# Patient Record
Sex: FEMALE | Race: WHITE
[De-identification: names, ages, dates, MRNs, and addresses within clinical notes are randomized per-mention and may not be internally consistent; named-entity substitution may affect disease eponyms.]

---

## 2023-04-05 ENCOUNTER — HOSPITAL ENCOUNTER (INPATIENT)
Dept: HOSPITAL 54 - ER | Age: 83
LOS: 4 days | Discharge: HOME | DRG: 871 | End: 2023-04-09
Attending: INTERNAL MEDICINE | Admitting: NURSE PRACTITIONER
Payer: MEDICARE

## 2023-04-05 VITALS — BODY MASS INDEX: 26.29 KG/M2 | HEIGHT: 64 IN | WEIGHT: 154 LBS

## 2023-04-05 VITALS — SYSTOLIC BLOOD PRESSURE: 157 MMHG | DIASTOLIC BLOOD PRESSURE: 66 MMHG

## 2023-04-05 DIAGNOSIS — J15.6: ICD-10-CM

## 2023-04-05 DIAGNOSIS — I50.30: ICD-10-CM

## 2023-04-05 DIAGNOSIS — M19.90: ICD-10-CM

## 2023-04-05 DIAGNOSIS — J45.901: ICD-10-CM

## 2023-04-05 DIAGNOSIS — I70.0: ICD-10-CM

## 2023-04-05 DIAGNOSIS — J45.909: ICD-10-CM

## 2023-04-05 DIAGNOSIS — J96.01: ICD-10-CM

## 2023-04-05 DIAGNOSIS — N17.0: ICD-10-CM

## 2023-04-05 DIAGNOSIS — A41.9: Primary | ICD-10-CM

## 2023-04-05 DIAGNOSIS — E88.09: ICD-10-CM

## 2023-04-05 DIAGNOSIS — E78.5: ICD-10-CM

## 2023-04-05 DIAGNOSIS — E44.1: ICD-10-CM

## 2023-04-05 DIAGNOSIS — I21.A1: ICD-10-CM

## 2023-04-05 DIAGNOSIS — I11.0: ICD-10-CM

## 2023-04-05 DIAGNOSIS — Z20.822: ICD-10-CM

## 2023-04-05 DIAGNOSIS — K74.60: ICD-10-CM

## 2023-04-05 DIAGNOSIS — E11.649: ICD-10-CM

## 2023-04-05 DIAGNOSIS — E87.5: ICD-10-CM

## 2023-04-05 LAB
ALBUMIN SERPL BCP-MCNC: 3.3 G/DL (ref 3.4–5)
ALP SERPL-CCNC: 141 U/L (ref 46–116)
ALT SERPL W P-5'-P-CCNC: 19 U/L (ref 12–78)
AST SERPL W P-5'-P-CCNC: 37 U/L (ref 15–37)
BASOPHILS # BLD AUTO: 0 K/UL (ref 0–0.2)
BASOPHILS NFR BLD AUTO: 0.2 % (ref 0–2)
BILIRUB DIRECT SERPL-MCNC: 0.3 MG/DL (ref 0–0.2)
BILIRUB SERPL-MCNC: 0.5 MG/DL (ref 0.2–1)
BILIRUB UR QL STRIP: NEGATIVE
BUN SERPL-MCNC: 47 MG/DL (ref 7–18)
CALCIUM SERPL-MCNC: 8.6 MG/DL (ref 8.5–10.1)
CHLORIDE SERPL-SCNC: 103 MMOL/L (ref 98–107)
CO2 SERPL-SCNC: 23 MMOL/L (ref 21–32)
COLOR UR: YELLOW
CREAT SERPL-MCNC: 2.5 MG/DL (ref 0.6–1.3)
DEPRECATED SQUAMOUS URNS QL MICRO: (no result) /HPF
EOSINOPHIL NFR BLD AUTO: 0.3 % (ref 0–6)
GLUCOSE SERPL-MCNC: 53 MG/DL (ref 74–106)
GLUCOSE UR STRIP-MCNC: NEGATIVE MG/DL
HCT VFR BLD AUTO: 29 % (ref 33–45)
HGB BLD-MCNC: 9.3 G/DL (ref 11.5–14.8)
LEUKOCYTE ESTERASE UR QL STRIP: NEGATIVE
LYMPHOCYTES NFR BLD AUTO: 0.7 K/UL (ref 0.8–4.8)
LYMPHOCYTES NFR BLD AUTO: 9.9 % (ref 20–44)
MCHC RBC AUTO-ENTMCNC: 33 G/DL (ref 31–36)
MCV RBC AUTO: 84 FL (ref 82–100)
MONOCYTES NFR BLD AUTO: 0.4 K/UL (ref 0.1–1.3)
MONOCYTES NFR BLD AUTO: 6.2 % (ref 2–12)
NEUTROPHILS # BLD AUTO: 5.5 K/UL (ref 1.8–8.9)
NEUTROPHILS NFR BLD AUTO: 83.4 % (ref 43–81)
NITRITE UR QL STRIP: NEGATIVE
PH UR STRIP: 5.5 [PH] (ref 5–8)
PLATELET # BLD AUTO: 137 K/UL (ref 150–450)
POTASSIUM SERPL-SCNC: 4.4 MMOL/L (ref 3.5–5.1)
PROT SERPL-MCNC: 8.2 G/DL (ref 6.4–8.2)
PROT UR QL STRIP: (no result) MG/DL
RBC # BLD AUTO: 3.42 MIL/UL (ref 4–5.2)
RBC #/AREA URNS HPF: (no result) /HPF (ref 0–2)
SODIUM SERPL-SCNC: 138 MMOL/L (ref 136–145)
UROBILINOGEN UR STRIP-MCNC: 0.2 EU/DL
WBC #/AREA URNS HPF: (no result) /HPF (ref 0–3)
WBC NRBC COR # BLD AUTO: 6.6 K/UL (ref 4.3–11)

## 2023-04-05 PROCEDURE — A4223 INFUSION SUPPLIES W/O PUMP: HCPCS

## 2023-04-05 PROCEDURE — C9803 HOPD COVID-19 SPEC COLLECT: HCPCS

## 2023-04-05 PROCEDURE — G0378 HOSPITAL OBSERVATION PER HR: HCPCS

## 2023-04-05 RX ADMIN — ATORVASTATIN CALCIUM SCH MG: 10 TABLET, FILM COATED ORAL at 22:54

## 2023-04-05 RX ADMIN — Medication SCH EACH: at 23:18

## 2023-04-05 NOTE — NUR
TELE RN ADMITTING NOTES



RECEIVED PATIENT AWAKE A/OX3. Hebrew SPEAKING BUT UNDERSTAND WORDS IN ENGLISH. PATIENT ON 
NASAL CANULA 2LPM. NO SOB, BREATHING EVENLY AND NON LABORED. NO IN ANY DISTRESS NOTED. IV 
ACCESS ON LEFT ARM #20 SALINE LOCK NOTED TO BE FLUSHING WELL AND INTACT. PATIENT DENIES ANY 
PAIN AT THIS MOMENT. PATIENT IS FEBRILE WITH TEMP .2. COOLING MEASURE IS DONE. 
MEDICATION IS GIVEN AS ORDERED. PATIENT ON TELE MONITOR READING OF SINUS RHYTHM 93BPM. 
PERFORMED SKIN ASSESSMENT AND BODY CHECK; SKIN NOTED TO BE INTACT NO REDNESS OR SKIN LESIONS 
NOTED. NO PRESENCE OF EDEMA. PATIENT IS ORIENTED TO THE ROOM AND HOW TO USE THE CALL LIGHT 
WHEN HELP IS NEEDED. ALL BELONGINGS ARE ACCOUNTED FOR. SAFETY MEASURE IN PLACED; BED LOCKED 
AND IN LOWEST POSITION, SIDE RAILS UP X2, HOB ELEVATED, CALL LIGHT AND BEDSIDE TABLE WITHIN 
PATIENT REACH.

## 2023-04-05 NOTE — NUR
RN NOTES



CHECKED PATIENT BLOOD SUGAR RESULT IS 34. CHARGE NURSE IS INFORMED. D5 DRIP IS GIVEN. WILL 
CONTINUE TO MONITOR.

## 2023-04-05 NOTE — NUR
BIB PARAMEDIC FROM HOME. LOW BLD SUGAR AND FEVER. INITIAL BLD SUGAR BY 
PARAMEDICS 40. GAVE IM GLUCAGON. REPEAT ACCUCHECK AT ADMISSION IS 48. PT PUT ON 
MONITOR AND PULSE, DID RECTAL TEMP 100.1F,   PUT ON GOWN. NURSING CARE DONE 
MADE MD AWAR OF THE BLD SUGAR. PT AWAKE ORIENTED WITH  ON O2 AT 4L. 
STRETCHER BORNE AWAITING FOR MD ORDERS

## 2023-04-06 VITALS — DIASTOLIC BLOOD PRESSURE: 63 MMHG | SYSTOLIC BLOOD PRESSURE: 161 MMHG

## 2023-04-06 VITALS — DIASTOLIC BLOOD PRESSURE: 62 MMHG | SYSTOLIC BLOOD PRESSURE: 160 MMHG

## 2023-04-06 VITALS — DIASTOLIC BLOOD PRESSURE: 49 MMHG | SYSTOLIC BLOOD PRESSURE: 136 MMHG

## 2023-04-06 VITALS — SYSTOLIC BLOOD PRESSURE: 132 MMHG | DIASTOLIC BLOOD PRESSURE: 59 MMHG

## 2023-04-06 VITALS — SYSTOLIC BLOOD PRESSURE: 171 MMHG | DIASTOLIC BLOOD PRESSURE: 67 MMHG

## 2023-04-06 VITALS — DIASTOLIC BLOOD PRESSURE: 52 MMHG | SYSTOLIC BLOOD PRESSURE: 121 MMHG

## 2023-04-06 VITALS — SYSTOLIC BLOOD PRESSURE: 161 MMHG | DIASTOLIC BLOOD PRESSURE: 63 MMHG

## 2023-04-06 LAB
ALBUMIN SERPL BCP-MCNC: 2.6 G/DL (ref 3.4–5)
ALP SERPL-CCNC: 115 U/L (ref 46–116)
ALT SERPL W P-5'-P-CCNC: 18 U/L (ref 12–78)
AST SERPL W P-5'-P-CCNC: 34 U/L (ref 15–37)
BASOPHILS # BLD AUTO: 0 K/UL (ref 0–0.2)
BASOPHILS NFR BLD AUTO: 0 % (ref 0–2)
BILIRUB SERPL-MCNC: 0.5 MG/DL (ref 0.2–1)
BUN SERPL-MCNC: 41 MG/DL (ref 7–18)
CALCIUM SERPL-MCNC: 8 MG/DL (ref 8.5–10.1)
CHLORIDE SERPL-SCNC: 106 MMOL/L (ref 98–107)
CO2 SERPL-SCNC: 19 MMOL/L (ref 21–32)
CREAT SERPL-MCNC: 1.9 MG/DL (ref 0.6–1.3)
EOSINOPHIL NFR BLD AUTO: 0 % (ref 0–6)
GLUCOSE SERPL-MCNC: 234 MG/DL (ref 74–106)
HCT VFR BLD AUTO: 25 % (ref 33–45)
HGB BLD-MCNC: 7.9 G/DL (ref 11.5–14.8)
LYMPHOCYTES NFR BLD AUTO: 0.2 K/UL (ref 0.8–4.8)
LYMPHOCYTES NFR BLD AUTO: 4.1 % (ref 20–44)
MAGNESIUM SERPL-MCNC: 2.5 MG/DL (ref 1.8–2.4)
MCHC RBC AUTO-ENTMCNC: 32 G/DL (ref 31–36)
MCV RBC AUTO: 84 FL (ref 82–100)
MONOCYTES NFR BLD AUTO: 0.1 K/UL (ref 0.1–1.3)
MONOCYTES NFR BLD AUTO: 2.3 % (ref 2–12)
NEUTROPHILS # BLD AUTO: 4.7 K/UL (ref 1.8–8.9)
NEUTROPHILS NFR BLD AUTO: 93.6 % (ref 43–81)
PHOSPHATE SERPL-MCNC: 4.8 MG/DL (ref 2.5–4.9)
PLATELET # BLD AUTO: 121 K/UL (ref 150–450)
POTASSIUM SERPL-SCNC: 5.2 MMOL/L (ref 3.5–5.1)
PROT SERPL-MCNC: 6.6 G/DL (ref 6.4–8.2)
RBC # BLD AUTO: 2.94 MIL/UL (ref 4–5.2)
SODIUM SERPL-SCNC: 139 MMOL/L (ref 136–145)
WBC NRBC COR # BLD AUTO: 5 K/UL (ref 4.3–11)

## 2023-04-06 RX ADMIN — Medication SCH EACH: at 07:28

## 2023-04-06 RX ADMIN — INSULIN GLARGINE SCH UNIT: 100 INJECTION, SOLUTION SUBCUTANEOUS at 18:06

## 2023-04-06 RX ADMIN — CEFEPIME HYDROCHLORIDE SCH MLS/HR: 1 INJECTION, POWDER, FOR SOLUTION INTRAMUSCULAR; INTRAVENOUS at 17:13

## 2023-04-06 RX ADMIN — FERROUS SULFATE TAB 325 MG (65 MG ELEMENTAL FE) SCH MG: 325 (65 FE) TAB at 08:48

## 2023-04-06 RX ADMIN — Medication SCH TAB: at 08:48

## 2023-04-06 RX ADMIN — ALBUTEROL SULFATE SCH MG: 2.5 SOLUTION RESPIRATORY (INHALATION) at 14:00

## 2023-04-06 RX ADMIN — SACUBITRIL AND VALSARTAN SCH EACH: 24; 26 TABLET, FILM COATED ORAL at 08:54

## 2023-04-06 RX ADMIN — LEVOTHYROXINE SODIUM SCH MCG: 75 TABLET ORAL at 07:43

## 2023-04-06 RX ADMIN — ASPIRIN 81 MG SCH MG: 81 TABLET ORAL at 08:48

## 2023-04-06 RX ADMIN — POLYETHYLENE GLYCOL 3350 SCH GM: 17 POWDER, FOR SOLUTION ORAL at 08:48

## 2023-04-06 RX ADMIN — ALBUTEROL SULFATE SCH MG: 2.5 SOLUTION RESPIRATORY (INHALATION) at 01:25

## 2023-04-06 RX ADMIN — INSULIN HUMAN PRN UNITS: 100 INJECTION, SOLUTION PARENTERAL at 07:31

## 2023-04-06 RX ADMIN — Medication SCH MG: at 01:25

## 2023-04-06 RX ADMIN — INSULIN GLARGINE SCH UNIT: 100 INJECTION, SOLUTION SUBCUTANEOUS at 21:56

## 2023-04-06 RX ADMIN — Medication SCH EACH: at 17:10

## 2023-04-06 RX ADMIN — ALBUTEROL SULFATE SCH MG: 2.5 SOLUTION RESPIRATORY (INHALATION) at 07:35

## 2023-04-06 RX ADMIN — ALBUTEROL SULFATE SCH MG: 2.5 SOLUTION RESPIRATORY (INHALATION) at 19:54

## 2023-04-06 RX ADMIN — INSULIN HUMAN PRN UNITS: 100 INJECTION, SOLUTION PARENTERAL at 11:27

## 2023-04-06 RX ADMIN — INSULIN HUMAN PRN UNITS: 100 INJECTION, SOLUTION PARENTERAL at 17:11

## 2023-04-06 RX ADMIN — Medication SCH EACH: at 11:27

## 2023-04-06 RX ADMIN — FLUTICASONE PROPIONATE SCH GM: 50 SPRAY, METERED NASAL at 08:53

## 2023-04-06 RX ADMIN — Medication SCH EACH: at 21:49

## 2023-04-06 RX ADMIN — Medication SCH MG: at 09:00

## 2023-04-06 RX ADMIN — Medication SCH MG: at 07:35

## 2023-04-06 RX ADMIN — ATORVASTATIN CALCIUM SCH MG: 10 TABLET, FILM COATED ORAL at 21:38

## 2023-04-06 RX ADMIN — NIFEDIPINE SCH MG: 60 TABLET, EXTENDED RELEASE ORAL at 08:55

## 2023-04-06 RX ADMIN — Medication SCH MG: at 14:00

## 2023-04-06 RX ADMIN — INSULIN HUMAN PRN UNITS: 100 INJECTION, SOLUTION PARENTERAL at 22:02

## 2023-04-06 RX ADMIN — SACUBITRIL AND VALSARTAN SCH EACH: 24; 26 TABLET, FILM COATED ORAL at 17:00

## 2023-04-06 RX ADMIN — ALBUTEROL SULFATE SCH MG: 2.5 SOLUTION RESPIRATORY (INHALATION) at 09:00

## 2023-04-06 RX ADMIN — Medication SCH MG: at 19:54

## 2023-04-06 RX ADMIN — PANTOPRAZOLE SODIUM SCH MG: 40 TABLET, DELAYED RELEASE ORAL at 07:43

## 2023-04-06 NOTE — NUR
RN NOTES



PT WITH HIGH LEVEL POTASSIUM 5.2 THIS MORNING, ADMINISTERED KAYEXALATE 15GMS PO AS ORDERED.

## 2023-04-06 NOTE — NUR
TELE RN OPENING NOTES



RECEIVED PATIENT AWAKE IN BED A/OX4. Slovenian SPEAKING BUT UNDERSTAND WORDS IN ENGLISH. ABLE 
TO MAKE NEEDS KNOWN. PATIENT ON NASAL CANULA 2LPM. NO SOB, BREATHING EVENLY AND NON LABORED. 
NO SIGNS OF ANY DISTRESS NOTED. IV ACCESS ON LEFT AC #20 SALINE LOCK NOTED, FLUSHING WELL 
AND INTACT. PATIENT DENIES ANY PAIN AT THIS MOMENT. PATIENT ON TELE MONITOR READING OF SINUS 
RHYTHM WITH FIRST DEGREE AVB. SAFETY MEASURE IN PLACED; BED LOCKED AND IN LOWEST POSITION, 
SIDE RAILS UP X2, HOB ELEVATED, CALL LIGHT AND BEDSIDE TABLE WITHIN PATIENT REACH. WILL 
CONTINUE TO MONITOR PATIENT.

## 2023-04-06 NOTE — NUR
RN NOTES 



PATIENT IS FEBRILE 101.1 COOLING MEASURE IS DONE. TYLENOL IS GIVEN AS ORDERED. WILL CONTINUE 
TO MONITOR.

## 2023-04-06 NOTE — NUR
TELE RN CLOSING





PATIENT IN BED AWAKE AND WATCHING TV, A/OX4. Zimbabwean SPEAKING BUT UNDERSTAND WORDS IN 
ENGLISH. ABLE TO MAKE NEEDS KNOWN. PATIENT ON NASAL CANULA 2LPM. NO SOB, BREATHING EVENLY 
AND NON LABORED. NO ANY SIGNS OF DISTRESS. IV ACCESS ON LEFT AC #20 SALINE LOCK, FLUSHING 
WELL, PATENT AND INTACT. PATIENT DENIES ANY PAIN AT THIS MOMENT. PATIENT ON TELE MONITOR 
READING OF SINUS RHYTHM WITH FIRST DEGREE AVB, HR 88. SAFETY MEASURE IN PLACED; BED LOCKED 
AND IN LOWEST POSITION, SIDE RAILS UP X2, HOB ELEVATED, CALL LIGHT AND BEDSIDE TABLE WITHIN 
PATIENT REACH. WILL ENDORSE TO THE NIGHT SHIFT NURSE FOR CONTINUITY OF CARE.

## 2023-04-06 NOTE — NUR
RN NOTES



REASSESSED PATIENT. BLOOD SUGAR IS 93. GIVEN ONE ORANGE JUICE TO HELP ELEVATE BLOOD SUGAR.

## 2023-04-06 NOTE — NUR
RN NOTES



PATIENT WITH ACCU-CHECK  MG/DL, 10 UNITS OF REGULAR INSULIN GIVEN AS ORDERED. 
RECHECKED @ 5867 AND PATIENT HAS AN ACCU-CHECK  MG/DL. NOTIFIED DR. PARKER WITH 
ORDER TO ADMINISTER LANTUS 5 UNITS NOW AND DAILY AT . ORDER CARRIED OUT.

## 2023-04-06 NOTE — NUR
TELE RN CLOSING NOTES



PATIENT IN BED AWAKE A/OX4. Korean SPEAKING BUT UNDERSTAND WORDS IN ENGLISH. ABLE TO MAKE 
NEEDS KNOWN. PATIENT ON NASAL CANULA 2LPM. NO SOB, BREATHING EVENLY AND NON LABORED. NO IN 
ANY DISTRESS NOTED. IV ACCESS ON LEFT ARM #20 SALINE LOCK NOTED TO BE FLUSHING WELL AND 
INTACT. PATIENT DENIES ANY PAIN AT THIS MOMENT. PATIENT ON TELE MONITOR READING OF SINUS 
RHYTHM WITH FIRST DEGREE AVB. LAB CALLED FOR TROPONIN THREAD FROM .8 DR ON CALL MADE 
AWARE, NOT NEW ORDERS GIVEN. SAFETY MEASURE IN PLACED; BED LOCKED AND IN LOWEST POSITION, 
SIDE RAILS UP X2, HOB ELEVATED, CALL LIGHT AND BEDSIDE TABLE WITHIN PATIENT REACH. WILL 
ENDORSE TO NEXT SHIFT NURSE FOR CONTINUITY OF CARE.

## 2023-04-06 NOTE — NUR
RN OPENING NOTE



RECEIVED PT. ASLEEP IN BED. PT IS A/OX4, Dutch SPEAKING BUT CAN SPEAK AND UNDERSTAND 
SIMPLE ENGLISH. PT IS IN 2LPM O2 VIA NASAL CANNULA, TOLERATING WELL, BREATHING AND UNLABORED 
@ THIS TIME. PT IV PRESENT ON LEFT AC @20G RUNNING NS @ 5MLS/HR, PATENT, INTACT AND FLUSHES 
WELL W/ NO S&SX OF INFILTRATION @ SITE NOTED. PT CAN INDEPENDENTLY WALK TO THE BATHROOM WITH 
ASSISTANCE. SAFETY MEASURES IS IN PLACE. BED PLACED AT ITS LOWEST AND LOCKED POSITION. SIDE 
RAILS UP X 2. BEDSIDE TABLE AND CALL LIGHT IS EASY REACH. BED ALARM IS ON. WILL CONTINUE TO 
MONITOR PT ACCORDINGLY.

## 2023-04-06 NOTE — NUR
RN NOTES



PATIENT BLOOD SUGAR  AND GAVE 10 UNITS OF INSULIN. RECHECKED BLOOD SUGAR AT 1146 AND 
IT'S 436. INFORMED DR. PARKER AND HE SAID TO CONTINUE USE OF SLIDING SCALE.

## 2023-04-07 VITALS — DIASTOLIC BLOOD PRESSURE: 67 MMHG | SYSTOLIC BLOOD PRESSURE: 148 MMHG

## 2023-04-07 VITALS — SYSTOLIC BLOOD PRESSURE: 104 MMHG | DIASTOLIC BLOOD PRESSURE: 51 MMHG

## 2023-04-07 VITALS — DIASTOLIC BLOOD PRESSURE: 68 MMHG | SYSTOLIC BLOOD PRESSURE: 158 MMHG

## 2023-04-07 VITALS — DIASTOLIC BLOOD PRESSURE: 72 MMHG | SYSTOLIC BLOOD PRESSURE: 147 MMHG

## 2023-04-07 LAB
BASOPHILS # BLD AUTO: 0 K/UL (ref 0–0.2)
BASOPHILS NFR BLD AUTO: 0 % (ref 0–2)
BILIRUB UR QL STRIP: NEGATIVE
BUN SERPL-MCNC: 60 MG/DL (ref 7–18)
CALCIUM SERPL-MCNC: 8.4 MG/DL (ref 8.5–10.1)
CHLORIDE SERPL-SCNC: 105 MMOL/L (ref 98–107)
CO2 SERPL-SCNC: 22 MMOL/L (ref 21–32)
COLOR UR: YELLOW
CREAT SERPL-MCNC: 2.1 MG/DL (ref 0.6–1.3)
CREAT UR-MCNC: 22 MG/DL (ref 30–125)
EOSINOPHIL NFR BLD AUTO: 0 % (ref 0–6)
GLUCOSE SERPL-MCNC: 324 MG/DL (ref 74–106)
GLUCOSE UR STRIP-MCNC: (no result) MG/DL
HCT VFR BLD AUTO: 26 % (ref 33–45)
HGB BLD-MCNC: 8.5 G/DL (ref 11.5–14.8)
LEUKOCYTE ESTERASE UR QL STRIP: NEGATIVE
LYMPHOCYTES NFR BLD AUTO: 0.1 K/UL (ref 0.8–4.8)
LYMPHOCYTES NFR BLD AUTO: 1.3 % (ref 20–44)
MAGNESIUM SERPL-MCNC: 2.6 MG/DL (ref 1.8–2.4)
MCHC RBC AUTO-ENTMCNC: 33 G/DL (ref 31–36)
MCV RBC AUTO: 83 FL (ref 82–100)
MONOCYTES NFR BLD AUTO: 0.3 K/UL (ref 0.1–1.3)
MONOCYTES NFR BLD AUTO: 4 % (ref 2–12)
NEUTROPHILS # BLD AUTO: 7.2 K/UL (ref 1.8–8.9)
NEUTROPHILS NFR BLD AUTO: 94.7 % (ref 43–81)
NITRITE UR QL STRIP: NEGATIVE
PH UR STRIP: 6 [PH] (ref 5–8)
PHOSPHATE SERPL-MCNC: 4.1 MG/DL (ref 2.5–4.9)
PLATELET # BLD AUTO: 149 K/UL (ref 150–450)
POTASSIUM SERPL-SCNC: 4 MMOL/L (ref 3.5–5.1)
PROT UR QL STRIP: (no result) MG/DL
RBC # BLD AUTO: 3.14 MIL/UL (ref 4–5.2)
RBC #/AREA URNS HPF: (no result) /HPF (ref 0–2)
SODIUM SERPL-SCNC: 141 MMOL/L (ref 136–145)
SODIUM UR-SCNC: 78 MMOL/L (ref 40–220)
UROBILINOGEN UR STRIP-MCNC: 0.2 EU/DL
WBC #/AREA URNS HPF: (no result) /HPF (ref 0–3)
WBC NRBC COR # BLD AUTO: 7.6 K/UL (ref 4.3–11)

## 2023-04-07 RX ADMIN — ACETAMINOPHEN PRN MG: 325 TABLET ORAL at 17:31

## 2023-04-07 RX ADMIN — PANTOPRAZOLE SODIUM SCH MG: 40 TABLET, DELAYED RELEASE ORAL at 07:46

## 2023-04-07 RX ADMIN — ALBUTEROL SULFATE SCH MG: 2.5 SOLUTION RESPIRATORY (INHALATION) at 01:47

## 2023-04-07 RX ADMIN — ALBUTEROL SULFATE SCH MG: 2.5 SOLUTION RESPIRATORY (INHALATION) at 20:22

## 2023-04-07 RX ADMIN — NIFEDIPINE SCH MG: 60 TABLET, EXTENDED RELEASE ORAL at 08:24

## 2023-04-07 RX ADMIN — FLUTICASONE PROPIONATE SCH GM: 50 SPRAY, METERED NASAL at 08:24

## 2023-04-07 RX ADMIN — SODIUM CHLORIDE SCH MLS/HR: 9 INJECTION, SOLUTION INTRAVENOUS at 21:39

## 2023-04-07 RX ADMIN — INSULIN HUMAN PRN UNITS: 100 INJECTION, SOLUTION PARENTERAL at 06:21

## 2023-04-07 RX ADMIN — FERROUS SULFATE TAB 325 MG (65 MG ELEMENTAL FE) SCH MG: 325 (65 FE) TAB at 08:18

## 2023-04-07 RX ADMIN — INSULIN HUMAN PRN UNITS: 100 INJECTION, SOLUTION PARENTERAL at 11:57

## 2023-04-07 RX ADMIN — Medication SCH TAB: at 08:18

## 2023-04-07 RX ADMIN — CEFEPIME HYDROCHLORIDE SCH MLS/HR: 1 INJECTION, POWDER, FOR SOLUTION INTRAMUSCULAR; INTRAVENOUS at 18:21

## 2023-04-07 RX ADMIN — ALBUTEROL SULFATE SCH MG: 2.5 SOLUTION RESPIRATORY (INHALATION) at 07:42

## 2023-04-07 RX ADMIN — SACUBITRIL AND VALSARTAN SCH EACH: 24; 26 TABLET, FILM COATED ORAL at 08:22

## 2023-04-07 RX ADMIN — Medication SCH MG: at 20:22

## 2023-04-07 RX ADMIN — POLYETHYLENE GLYCOL 3350 SCH GM: 17 POWDER, FOR SOLUTION ORAL at 08:18

## 2023-04-07 RX ADMIN — Medication SCH EACH: at 11:57

## 2023-04-07 RX ADMIN — ALBUTEROL SULFATE SCH MG: 2.5 SOLUTION RESPIRATORY (INHALATION) at 13:08

## 2023-04-07 RX ADMIN — LEVOTHYROXINE SODIUM SCH MCG: 75 TABLET ORAL at 07:46

## 2023-04-07 RX ADMIN — Medication SCH EACH: at 06:21

## 2023-04-07 RX ADMIN — SACUBITRIL AND VALSARTAN SCH EACH: 24; 26 TABLET, FILM COATED ORAL at 16:16

## 2023-04-07 RX ADMIN — Medication SCH MG: at 13:08

## 2023-04-07 RX ADMIN — INSULIN HUMAN PRN UNITS: 100 INJECTION, SOLUTION PARENTERAL at 17:19

## 2023-04-07 RX ADMIN — Medication SCH EACH: at 22:27

## 2023-04-07 RX ADMIN — INSULIN HUMAN PRN UNITS: 100 INJECTION, SOLUTION PARENTERAL at 22:24

## 2023-04-07 RX ADMIN — Medication SCH EACH: at 17:20

## 2023-04-07 RX ADMIN — ASPIRIN 81 MG SCH MG: 81 TABLET ORAL at 08:18

## 2023-04-07 RX ADMIN — Medication SCH MG: at 01:47

## 2023-04-07 RX ADMIN — ATORVASTATIN CALCIUM SCH MG: 10 TABLET, FILM COATED ORAL at 22:00

## 2023-04-07 RX ADMIN — Medication SCH MG: at 07:42

## 2023-04-07 NOTE — NUR
RN NOTES



PATIENT COMPLAINED OF LOWER BACK PAIN AND ASKED FOR SOME MEDICINE. SHE RATE THE PAIN 3 OUT 
OF 10. TYLENOL 650MG GIVEN.

## 2023-04-07 NOTE — NUR
RN CLOSING NOTE



PT IS AWAKE AND RESTING COMFORTABLY IN BED. PT IS A/O X 4, RESPONSIVE AND FOLLOWS VERBAL 
COMMAND. PT IS IN 2LPM O2 VIA NASALA CANNULA, W/ NO S&SX OF RESPIRATORY DISTRESS NOTED @ 
THIS TIME. PT IV PRESENT ON LEFT AC #20G SALINE LOCK,  INTACT AND FLUSHES WELL WITH NO S &SX 
OF INFILTRATION. PT IS ON TELE MONITOR W/ CURRENT READING OF SINUS TACHYCARDIA  BPM. 
PT INDEPENDENTLY WALKS IN THE BATHROOM W/ STANDBY ASSIST.  ADMINISTERED MEDICATION 
ACCORDINGLY AS PER MD'S ORDER. VITAL SIGNS TAKEN AND DOCUMENTED. ALL NEEDS ATTENDED. SAFETY 
MEASURES IS IN PLACE. BED AT ITS LOWEST AND LOCKED POSITION. SIDE RAILS UP X 2.  BEDSIDE 
TABLE AND CALL LIGHT IS EASY REACH. BED ALARM IS ON. WILL ENDORSE TO THE NEXT SHIFT FOR 
CONTINUITY OF CARE.

## 2023-04-07 NOTE — NUR
TELE RN OPENING NOTES:



RECEIVED PATIENT AWAEK IN BED, BED IN LOW POSITION CALL LIGHTS WITHIN REACH, NO COMPLAIN OF 
PAIN AND DISCOMFORT A THIS TIME, ON ROOM AIR SATURATING WELL, PATIENT IS A/O X4 [place] citizen 
SPEAKING ABLE TO MAKE NEEDS KNOWN, ON TELE MONITOR- SR-94 , PATIENT IS AMBULATORY WITH 
ASSIST,  IV LINE AT LAC#20 SL, PATIENT KEPT CLEAN AND DRY ALL NEEDS MET WILL CONTINUE TO 
MONITOR.

## 2023-04-07 NOTE — NUR
RECEIVED PATIENT ON 2LNC SATURATIONS AT 98%. MD ORDERED HHN TXS SHIVA WELL WITH NO ADVERSE 
REACTION NOTED. NO SOB NOTED.

## 2023-04-07 NOTE — NUR
TELE RN OPENING NOTES



RECEIVED PATIENT AWAKE IN BED, A/OX4. Kiswahili SPEAKING BUT UNDERSTAND WORDS IN ENGLISH. 
ABLE TO MAKE NEEDS KNOWN. PATIENT ON NASAL CANULA 2LPM. NO SOB, BREATHING EVENLY AND NON 
LABORED. NO SIGNS OF ANY DISTRESS NOTED. IV ACCESS ON LEFT AC #20 SALINE LOCK NOTED, 
FLUSHING WELL AND INTACT. NO S & SX OF INFILTRATION. PATIENT DENIES ANY PAIN AT THIS MOMENT. 
PATIENT ON TELE MONITOR READING OF SINUS RHYTHM HR:93. SAFETY MEASURE IN PLACED; BED LOCKED 
AND IN LOWEST POSITION, SIDE RAILS UP X2, HOB ELEVATED, CALL LIGHT AND BEDSIDE TABLE WITHIN 
PATIENT REACH. WILL CONTINUE TO MONITOR PATIENT.

## 2023-04-07 NOTE — NUR
TELE RN CLOSING NOTES



PATIENT IN BED AWAKE AND WATCHING TV, A/OX4. East Timorese SPEAKING BUT UNDERSTAND WORDS IN 
ENGLISH. ABLE TO MAKE NEEDS KNOWN. PATIENT ON NASAL CANULA 2LPM. BREATHING EVENLY AND NON 
LABORED. NO ANY SIGNS OF DISTRESS. IV ACCESS ON LEFT AC #20 SALINE LOCK, FLUSHING WELL, 
PATENT AND INTACT. PATIENT DENIES ANY PAIN AT THIS MOMENT. PATIENT ON TELE MONITOR READING 
OF SINUS RHYTHM, HR 88. SAFETY MEASURE IN PLACED; BED LOCKED AND IN LOWEST POSITION, SIDE 
RAILS UP X2, HOB ELEVATED, CALL LIGHT AND BEDSIDE TABLE WITHIN PATIENT REACH. WILL ENDORSE 
TO THE NIGHT SHIFT NURSE FOR CONTINUITY OF CARE.

## 2023-04-08 VITALS — DIASTOLIC BLOOD PRESSURE: 62 MMHG | SYSTOLIC BLOOD PRESSURE: 129 MMHG

## 2023-04-08 VITALS — SYSTOLIC BLOOD PRESSURE: 155 MMHG | DIASTOLIC BLOOD PRESSURE: 70 MMHG

## 2023-04-08 VITALS — DIASTOLIC BLOOD PRESSURE: 65 MMHG | SYSTOLIC BLOOD PRESSURE: 141 MMHG

## 2023-04-08 VITALS — SYSTOLIC BLOOD PRESSURE: 139 MMHG | DIASTOLIC BLOOD PRESSURE: 60 MMHG

## 2023-04-08 VITALS — DIASTOLIC BLOOD PRESSURE: 65 MMHG | SYSTOLIC BLOOD PRESSURE: 153 MMHG

## 2023-04-08 VITALS — SYSTOLIC BLOOD PRESSURE: 151 MMHG | DIASTOLIC BLOOD PRESSURE: 68 MMHG

## 2023-04-08 LAB
ALBUMIN SERPL BCP-MCNC: 2.5 G/DL (ref 3.4–5)
ALP SERPL-CCNC: 103 U/L (ref 46–116)
ALT SERPL W P-5'-P-CCNC: 22 U/L (ref 12–78)
AST SERPL W P-5'-P-CCNC: 33 U/L (ref 15–37)
BILIRUB SERPL-MCNC: 0.5 MG/DL (ref 0.2–1)
BUN SERPL-MCNC: 54 MG/DL (ref 7–18)
CALCIUM SERPL-MCNC: 8.4 MG/DL (ref 8.5–10.1)
CHLORIDE SERPL-SCNC: 109 MMOL/L (ref 98–107)
CO2 SERPL-SCNC: 24 MMOL/L (ref 21–32)
CREAT SERPL-MCNC: 2.1 MG/DL (ref 0.6–1.3)
GLUCOSE SERPL-MCNC: 225 MG/DL (ref 74–106)
POTASSIUM SERPL-SCNC: 4 MMOL/L (ref 3.5–5.1)
PROT SERPL-MCNC: 7.1 G/DL (ref 6.4–8.2)
SODIUM SERPL-SCNC: 142 MMOL/L (ref 136–145)

## 2023-04-08 RX ADMIN — INSULIN HUMAN PRN UNITS: 100 INJECTION, SOLUTION PARENTERAL at 12:02

## 2023-04-08 RX ADMIN — NIFEDIPINE SCH MG: 60 TABLET, EXTENDED RELEASE ORAL at 08:35

## 2023-04-08 RX ADMIN — Medication SCH MG: at 07:35

## 2023-04-08 RX ADMIN — INSULIN HUMAN PRN UNITS: 100 INJECTION, SOLUTION PARENTERAL at 17:14

## 2023-04-08 RX ADMIN — Medication SCH EACH: at 11:56

## 2023-04-08 RX ADMIN — FERROUS SULFATE TAB 325 MG (65 MG ELEMENTAL FE) SCH MG: 325 (65 FE) TAB at 08:35

## 2023-04-08 RX ADMIN — FLUTICASONE PROPIONATE SCH GM: 50 SPRAY, METERED NASAL at 08:35

## 2023-04-08 RX ADMIN — Medication SCH MG: at 13:14

## 2023-04-08 RX ADMIN — ACETAMINOPHEN PRN MG: 325 TABLET ORAL at 21:32

## 2023-04-08 RX ADMIN — ASPIRIN 81 MG SCH MG: 81 TABLET ORAL at 08:35

## 2023-04-08 RX ADMIN — PANTOPRAZOLE SODIUM SCH MG: 40 TABLET, DELAYED RELEASE ORAL at 07:32

## 2023-04-08 RX ADMIN — ALBUTEROL SULFATE SCH MG: 2.5 SOLUTION RESPIRATORY (INHALATION) at 20:05

## 2023-04-08 RX ADMIN — LEVOTHYROXINE SODIUM SCH MCG: 75 TABLET ORAL at 07:33

## 2023-04-08 RX ADMIN — Medication SCH EACH: at 21:32

## 2023-04-08 RX ADMIN — SACUBITRIL AND VALSARTAN SCH EACH: 24; 26 TABLET, FILM COATED ORAL at 16:38

## 2023-04-08 RX ADMIN — Medication SCH MG: at 20:06

## 2023-04-08 RX ADMIN — CEFEPIME HYDROCHLORIDE SCH MLS/HR: 1 INJECTION, POWDER, FOR SOLUTION INTRAMUSCULAR; INTRAVENOUS at 17:05

## 2023-04-08 RX ADMIN — Medication SCH MG: at 02:27

## 2023-04-08 RX ADMIN — SODIUM CHLORIDE SCH MLS/HR: 9 INJECTION, SOLUTION INTRAVENOUS at 17:05

## 2023-04-08 RX ADMIN — INSULIN HUMAN PRN UNITS: 100 INJECTION, SOLUTION PARENTERAL at 21:44

## 2023-04-08 RX ADMIN — SACUBITRIL AND VALSARTAN SCH EACH: 24; 26 TABLET, FILM COATED ORAL at 08:36

## 2023-04-08 RX ADMIN — INSULIN HUMAN PRN UNITS: 100 INJECTION, SOLUTION PARENTERAL at 06:56

## 2023-04-08 RX ADMIN — Medication SCH EACH: at 17:16

## 2023-04-08 RX ADMIN — Medication SCH TAB: at 08:35

## 2023-04-08 RX ADMIN — Medication SCH EACH: at 06:56

## 2023-04-08 RX ADMIN — ALBUTEROL SULFATE SCH MG: 2.5 SOLUTION RESPIRATORY (INHALATION) at 07:35

## 2023-04-08 RX ADMIN — ALBUTEROL SULFATE SCH MG: 2.5 SOLUTION RESPIRATORY (INHALATION) at 02:27

## 2023-04-08 RX ADMIN — ALBUTEROL SULFATE SCH MG: 2.5 SOLUTION RESPIRATORY (INHALATION) at 13:14

## 2023-04-08 RX ADMIN — POLYETHYLENE GLYCOL 3350 SCH GM: 17 POWDER, FOR SOLUTION ORAL at 08:35

## 2023-04-08 NOTE — NUR
TELE RN CLOSING NOTES: 



PATIENT SLEEP IN BED COMFORTABLY, BED IN LOW POSITION CALL LIGHTS WITHIN REACH, NO COMPLAIN 
OF PAIN AND DISCOMFORT AT THIS TIME, ON O2 INHALATION AT 2LPM SATURATING WELL, PATIENT IS 
A/O X4 ABLE TO MAKE NEEDS KNOWN, AMBULATORY WITH ASSISTANCE, ON TELE MONITOR-SR-100 I LINE 
AT LAC#20 WITH ONGOING 0.9NSS@50ML/HR INFUSING WELL, PATIENT KEPT CLEAN AND DRY ALL NEEDS 
MET ENDORSE TO INCOMING SHIFT

## 2023-04-08 NOTE — NUR
TELE RN OPENING NOTES:



RECEIVED PT IN BED AWAKE, A/OX4 Ukrainian SPEAKING AND ABLE TO MAKE NEEDS KNOWN, NO SOB OR 
CARDIAC DISTRESS NOTED. ON O2 INHALATION AT 2LPM VIA NC AND TOLERATING WELL,ON TELE MONITOR 
WITH CURRENT READING OF SINUS RHYTHM @ 94BPM.IV ACCESS ON LAC GAUGE 20 PATENT, INTACT AND 
INFUSING NS 1L @ 50ML/HR. SAFETY MEASURES MAINTAINED: BED LOCKED AND IN LOWEST POSITION. 
SIDE RAILS UP X 2. CALL LIGHT IN EASY REACH FOR HELP. WILL MONITOR PT ACCORDINGLY.

## 2023-04-08 NOTE — NUR
TELE RN OPENING NOTE 



PATIENT AWAKE IN BED, ALERT/ORIENTED X 4, PRIMARILY Sammarinese SPEAKING BUT ABLE TO MAKE BASIC 
NEEDS KNOWN. PATIENT ON 2 LPM OF O2 VIA NASAL CANNULA, NO S/S OF DISTRESS OR SOB NOTED, 
BREATHING EVEN AND UNLABORED. PATIENT ON EXTERNAL CARDIAC MONITOR READING SINUS RHYTHM, HR: 
92. IV ACCESS ON LAC #20G INTACT AND INFUSING NS @ 50 ML/HR. SAFETY MEASURES IN PLACE: CALL 
LIGHT WITHIN REACH, SIDE RAILS UP X 2, BED LOCKED IN LOWEST POSITION, HOB ELEVATED, BED 
ALARM ON. WILL CONTINUE TO MONITOR PATIENT

## 2023-04-08 NOTE — NUR
TELE RN CLOSING NOTES:



PT IN BED AWAKE, A/OX4 Occitan SPEAKING AND ABLE TO MAKE NEEDS KNOWN, NO SOB OR CARDIAC 
DISTRESS NOTED. ON O2 INHALATION AT 2LPM VIA NC AND TOLERATING WELL,ON TELE MONITOR WITH 
CURRENT READING OF SINUS RHYTHM @ 93BPM.IV ACCESS ON LAC GAUGE 20 PATENT, INTACT AND 
INFUSING NS 1L @ 50ML/HR. SAFETY MEASURES MAINTAINED: BED LOCKED AND IN LOWEST POSITION. 
SIDE RAILS UP X 2. CALL LIGHT IN EASY REACH FOR HELP. WILL ENDORSE TO NIGHT SHIFT RN FOR 
CONTINUITY OF CARE. normal (ped)... In no apparent distress.

## 2023-04-08 NOTE — NUR
TELE RN NOTE 



PATIENT C/O RIB PAIN FROM COUGHING, TYLENOL 650 MG PO GIVEN AS ORDERED, WILL CONTINUE TO 
MONITOR

## 2023-04-08 NOTE — NUR
TELE RN NOTE 



PATIENT COUGHING AND REQUESTING COUGH MEDICINE, TESSALON PERLE 100 MG PO GIVEN AS ORDERED. 
WILL CONTINUE TO MONITOR

## 2023-04-09 VITALS — DIASTOLIC BLOOD PRESSURE: 59 MMHG | SYSTOLIC BLOOD PRESSURE: 131 MMHG

## 2023-04-09 VITALS — DIASTOLIC BLOOD PRESSURE: 64 MMHG | SYSTOLIC BLOOD PRESSURE: 154 MMHG

## 2023-04-09 VITALS — SYSTOLIC BLOOD PRESSURE: 143 MMHG | DIASTOLIC BLOOD PRESSURE: 66 MMHG

## 2023-04-09 LAB
ALBUMIN SERPL BCP-MCNC: 2.3 G/DL (ref 3.4–5)
ALP SERPL-CCNC: 99 U/L (ref 46–116)
ALT SERPL W P-5'-P-CCNC: 35 U/L (ref 12–78)
AST SERPL W P-5'-P-CCNC: 38 U/L (ref 15–37)
BILIRUB SERPL-MCNC: 0.5 MG/DL (ref 0.2–1)
BUN SERPL-MCNC: 47 MG/DL (ref 7–18)
CALCIUM SERPL-MCNC: 8.1 MG/DL (ref 8.5–10.1)
CHLORIDE SERPL-SCNC: 110 MMOL/L (ref 98–107)
CO2 SERPL-SCNC: 23 MMOL/L (ref 21–32)
CREAT SERPL-MCNC: 2 MG/DL (ref 0.6–1.3)
GLUCOSE SERPL-MCNC: 173 MG/DL (ref 74–106)
POTASSIUM SERPL-SCNC: 3.7 MMOL/L (ref 3.5–5.1)
PROT SERPL-MCNC: 6.9 G/DL (ref 6.4–8.2)
SODIUM SERPL-SCNC: 143 MMOL/L (ref 136–145)

## 2023-04-09 RX ADMIN — Medication SCH TAB: at 08:24

## 2023-04-09 RX ADMIN — ALBUTEROL SULFATE SCH MG: 2.5 SOLUTION RESPIRATORY (INHALATION) at 07:30

## 2023-04-09 RX ADMIN — FLUTICASONE PROPIONATE SCH GM: 50 SPRAY, METERED NASAL at 08:24

## 2023-04-09 RX ADMIN — PANTOPRAZOLE SODIUM SCH MG: 40 TABLET, DELAYED RELEASE ORAL at 08:24

## 2023-04-09 RX ADMIN — SACUBITRIL AND VALSARTAN SCH EACH: 24; 26 TABLET, FILM COATED ORAL at 09:20

## 2023-04-09 RX ADMIN — Medication SCH MG: at 01:35

## 2023-04-09 RX ADMIN — POLYETHYLENE GLYCOL 3350 SCH GM: 17 POWDER, FOR SOLUTION ORAL at 08:24

## 2023-04-09 RX ADMIN — ALBUTEROL SULFATE SCH MG: 2.5 SOLUTION RESPIRATORY (INHALATION) at 01:35

## 2023-04-09 RX ADMIN — LEVOTHYROXINE SODIUM SCH MCG: 75 TABLET ORAL at 08:24

## 2023-04-09 RX ADMIN — NIFEDIPINE SCH MG: 60 TABLET, EXTENDED RELEASE ORAL at 09:20

## 2023-04-09 RX ADMIN — Medication SCH MG: at 07:30

## 2023-04-09 RX ADMIN — FERROUS SULFATE TAB 325 MG (65 MG ELEMENTAL FE) SCH MG: 325 (65 FE) TAB at 08:24

## 2023-04-09 RX ADMIN — INSULIN HUMAN PRN UNITS: 100 INJECTION, SOLUTION PARENTERAL at 11:30

## 2023-04-09 RX ADMIN — Medication SCH EACH: at 11:27

## 2023-04-09 RX ADMIN — INSULIN HUMAN PRN UNITS: 100 INJECTION, SOLUTION PARENTERAL at 06:51

## 2023-04-09 RX ADMIN — Medication SCH EACH: at 06:49

## 2023-04-09 RX ADMIN — ASPIRIN 81 MG SCH MG: 81 TABLET ORAL at 08:24

## 2023-04-09 NOTE — NUR
TELE RN OPENING NOTE 



RECEIVED PATIENT AWAKE IN BED, A/O X 4, Namibian SPEAKING WITH BASIC ENGLISH AND ABLE TO 
MAKE NEEDS KNOWN. PATIENT ON 1 LPM OF O2 VIA NASAL CANNULA, NO S/S OF DISTRESS OR SOB NOTED, 
BREATHING EVEN AND UNLABORED, SPO2 92-95%. PATIENT ON EXTERNAL CARDIAC MONITOR READING SINUS 
RHYTHM, HR: 98. IV ACCESS ON LAC #20G INTACT AND INFUSING NS @ 50 ML/HR.  SAFETY MEASURES IN 
PLACE: CALL LIGHT WITHIN REACH, SIDE RAILS UP X 2, BED LOCKED IN LOWEST POSITION, HOB 
ELEVATED, BED ALARM ON. WILL CONTINUE TO MONITOR THE PATIENT FOR ABILIO

## 2023-04-09 NOTE — NUR
TELE RN CLOSING NOTE 



PATIENT AWAKE IN BED, ALERT/ORIENTED X 4, PRIMARILY Greek SPEAKING BUT ABLE TO MAKE BASIC 
NEEDS KNOWN. PATIENT ON 1 LPM OF O2 VIA NASAL CANNULA, NO S/S OF DISTRESS OR SOB NOTED, 
BREATHING EVEN AND UNLABORED, SPO2 92-95%. PATIENT ON EXTERNAL CARDIAC MONITOR READING SINUS 
RHYTHM, HR: 97. IV ACCESS ON LAC #20G INTACT AND INFUSING NS @ 50 ML/HR. MEDICATIONS GIVEN 
AS ORDERED, PT NEEDS MET THROUGHOUT SHIFT, ASSISTED PATIENT TO BSC 4 TIMES THIS SHIFT, NO 
BM. SAFETY MEASURES IN PLACE: CALL LIGHT WITHIN REACH, SIDE RAILS UP X 2, BED LOCKED IN 
LOWEST POSITION, HOB ELEVATED, BED ALARM ON. ENDORSED TO DAYSHIFT RN FOR CONTINUITY OF CARE

## 2023-04-09 NOTE — NUR
DISCHARGED PATIENT TO HOME, PATIENT'S FAMILY ON BEDSIDE DURING THE DISCHARGE. PATIENT WAS  
A/O X 4, Lebanese SPEAKING WITH BASIC ENGLISH AND ABLE TO MAKE NEEDS KNOWN. PATIENT WAS ON 
RA WHEN DISCHARGED, NO S/S OF DISTRESS OR SOB NOTED, BREATHING EVEN AND UNLABORED, SPO2 94%. 
IV ACCESS REMOVED, TIP INTACT, NO SIGNS OF BLEEDING WAS NOTED. ALL BELONGINGS ACCOUNTED FOR, 
ALL FAMILY REQUESTS ADDRESSED. DISCHARGED INSTRUCTIONS DISCUSSED WITH THE PATIENT AND FAMILY 
BOTH VERBALLY AND IN WRITING. SAFETY PROTOCOL MAINTAINED. PATIENT LEFT THE UNIT AT 1230. 
CHARGE NURSE AWARE OF THE DISCHARGE.

## 2023-04-09 NOTE — NUR
Prior to discharge, Patient & family requested that two medications namely: Atorvastatin 
Calcium (Lipitor) 10 mg tab, 40 mg PO HS 30 days #30 tab & Doxycycline Hyclate 100 mg 
Capsule 1 cap PO BID #14 Cap, be routed and  at [facility] located at [address] [place] [address], tel# [phone]. The reason for the reroute is preferred pharmacy closed on 
Sundays. All remaining medications shall be picked up the next day. Request granted as 
approved by Charge nurse. MD informed.

## 2023-05-05 ENCOUNTER — HOSPITAL ENCOUNTER (INPATIENT)
Dept: HOSPITAL 54 - ER | Age: 83
LOS: 3 days | Discharge: NURSING, REHAB OR LONG-TERM CARE | DRG: 535 | End: 2023-05-08
Attending: INTERNAL MEDICINE | Admitting: INTERNAL MEDICINE
Payer: MEDICARE

## 2023-05-05 VITALS — HEIGHT: 64 IN | WEIGHT: 154 LBS | BODY MASS INDEX: 26.29 KG/M2

## 2023-05-05 VITALS — SYSTOLIC BLOOD PRESSURE: 145 MMHG | DIASTOLIC BLOOD PRESSURE: 64 MMHG

## 2023-05-05 DIAGNOSIS — E86.1: ICD-10-CM

## 2023-05-05 DIAGNOSIS — Z79.84: ICD-10-CM

## 2023-05-05 DIAGNOSIS — J45.909: ICD-10-CM

## 2023-05-05 DIAGNOSIS — E11.9: ICD-10-CM

## 2023-05-05 DIAGNOSIS — Z20.822: ICD-10-CM

## 2023-05-05 DIAGNOSIS — Z79.4: ICD-10-CM

## 2023-05-05 DIAGNOSIS — S32.511A: Primary | ICD-10-CM

## 2023-05-05 DIAGNOSIS — Z79.82: ICD-10-CM
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
## 2023-05-05 DIAGNOSIS — Z79.899: ICD-10-CM

## 2023-05-05 DIAGNOSIS — D69.6: ICD-10-CM

## 2023-05-05 DIAGNOSIS — I10: ICD-10-CM

## 2023-05-05 DIAGNOSIS — N17.0: ICD-10-CM

## 2023-05-05 DIAGNOSIS — M19.90: ICD-10-CM

## 2023-05-05 DIAGNOSIS — S42.214A: ICD-10-CM

## 2023-05-05 DIAGNOSIS — D63.8: ICD-10-CM

## 2023-05-05 DIAGNOSIS — D62: ICD-10-CM
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
## 2023-05-05 DIAGNOSIS — K21.9: ICD-10-CM

## 2023-05-05 DIAGNOSIS — E78.5: ICD-10-CM

## 2023-05-05 DIAGNOSIS — E03.9: ICD-10-CM

## 2023-05-05 DIAGNOSIS — Z79.51: ICD-10-CM

## 2023-05-05 DIAGNOSIS — D50.9: ICD-10-CM

## 2023-05-05 LAB
BASOPHILS # BLD AUTO: 0 K/UL (ref 0–0.2)
BASOPHILS NFR BLD AUTO: 0.7 % (ref 0–2)
BUN SERPL-MCNC: 38 MG/DL (ref 7–18)
CALCIUM SERPL-MCNC: 8.2 MG/DL (ref 8.5–10.1)
CHLORIDE SERPL-SCNC: 108 MMOL/L (ref 98–107)
CO2 SERPL-SCNC: 21 MMOL/L (ref 21–32)
CREAT SERPL-MCNC: 2.2 MG/DL (ref 0.6–1.3)
EOSINOPHIL NFR BLD AUTO: 2.2 % (ref 0–6)
EOSINOPHIL NFR BLD MANUAL: 2 % (ref 0–4)
GLUCOSE SERPL-MCNC: 122 MG/DL (ref 74–106)
HCT VFR BLD AUTO: 25 % (ref 33–45)
HGB BLD-MCNC: 8 G/DL (ref 11.5–14.8)
LYMPHOCYTES NFR BLD AUTO: 0.6 K/UL (ref 0.8–4.8)
LYMPHOCYTES NFR BLD AUTO: 12.7 % (ref 20–44)
LYMPHOCYTES NFR BLD MANUAL: 10 % (ref 16–48)
MCHC RBC AUTO-ENTMCNC: 32 G/DL (ref 31–36)
MCV RBC AUTO: 86 FL (ref 82–100)
MONOCYTES NFR BLD AUTO: 0.3 K/UL (ref 0.1–1.3)
MONOCYTES NFR BLD AUTO: 7.7 % (ref 2–12)
MONOCYTES NFR BLD MANUAL: 5 % (ref 0–11)
NEUTROPHILS # BLD AUTO: 3.5 K/UL (ref 1.8–8.9)
NEUTROPHILS NFR BLD AUTO: 76.7 % (ref 43–81)
NEUTS BAND NFR BLD MANUAL: 2 % (ref 0–5)
NEUTS SEG NFR BLD MANUAL: 81 % (ref 42–76)
PLATELET # BLD AUTO: 100 K/UL (ref 150–450)
POTASSIUM SERPL-SCNC: 5.3 MMOL/L (ref 3.5–5.1)
RBC # BLD AUTO: 2.92 MIL/UL (ref 4–5.2)
SODIUM SERPL-SCNC: 139 MMOL/L (ref 136–145)
WBC NRBC COR # BLD AUTO: 4.5 K/UL (ref 4.3–11)

## 2023-05-05 PROCEDURE — G0378 HOSPITAL OBSERVATION PER HR: HCPCS

## 2023-05-05 PROCEDURE — A4223 INFUSION SUPPLIES W/O PUMP: HCPCS

## 2023-05-05 PROCEDURE — P9016 RBC LEUKOCYTES REDUCED: HCPCS

## 2023-05-05 PROCEDURE — C9803 HOPD COVID-19 SPEC COLLECT: HCPCS

## 2023-05-05 RX ADMIN — Medication SCH EACH: at 21:39

## 2023-05-05 RX ADMIN — Medication PRN MG: at 23:26

## 2023-05-05 RX ADMIN — ENOXAPARIN SODIUM SCH MG: 30 INJECTION SUBCUTANEOUS at 20:32

## 2023-05-05 RX ADMIN — Medication SCH EACH: at 19:51

## 2023-05-05 RX ADMIN — SODIUM CHLORIDE PRN MLS/HR: 9 INJECTION, SOLUTION INTRAVENOUS at 18:11

## 2023-05-05 RX ADMIN — INSULIN HUMAN PRN UNITS: 100 INJECTION, SOLUTION PARENTERAL at 22:19

## 2023-05-05 RX ADMIN — Medication PRN MG: at 18:28

## 2023-05-05 NOTE — NUR
RN NOTES



PATIENT 2D ECHO CANCELLED BECAUSE PATIENT HAD 2D ECHO ONE MONTH AGO. DR. HIRSCH MADE AWARE. 
WILL CONTINUE TO MONITOR

## 2023-05-05 NOTE — NUR
RN NOTES



PATIENT HAVE LOVENOX 30MG DUE BUT THE PLATELET , DR. HIRSCH INFORMED WITH ORDER TO 
GIVE LOVENOX 30MG. WILL CONTINUE TO MONITOR

## 2023-05-05 NOTE — NUR
RN NOTE- MPT ADMITTED TO [facility] FOR FX RT HUMERUS AND PELVIC FX. BEGIN ADMIT PROCESS. VS - 
BP- 145/62, HR- 71, RR- 22, T- 98.0, 02 SATS 97% RA. PT IN PAIN. WILL MEDICATE. IVF NS AT 
75/HR INITIATED TO 18G LT HAND. SIDE RAILS UP, CALL LIGHT IN REACH, BED LOCKED. MONITOR 
ASSIST PRN. ICE BAG PROVIDED FOR RT HUMERUS FX/COMFORT.

## 2023-05-05 NOTE — NUR
RN NOTES



WITH POTASSIUM OF 5.3, KAYEXELATE 30GMS GIVEN AND WELL TOLERATED BY THE PATIENT. WILL 
CONTINUE TO MONITOR.

## 2023-05-05 NOTE — NUR
RN ADMITTING NOTES



ADMITTED A 83 YR/ OLD FEMALE UNDER THE SERVICE OF DR. GALICIA S/P GROUND LEVEL FALL.A/O X 4 
Sami SPEAKING BUT ABLE TO UNDERSTAND ENGLISH,  ON MODERATE HIGH EMILY REST POSITION. 
BREATHING EVENLY AND UNLABORED. WITH IV ACCESS AT LEFT HAND #20G WITH NS1L AT 75ML/HR 
INFUSING WELL. HISTORY TAKEN AND RECORDED, PHYSICAL EXAMINATION DONE, WITH FRACTURE AT RIGHT 
SIDE OF THE BODY. PATIENT AND RELATIVE ORIENTED TO THE UNIT POLICY, PATIENT HAS NO COVID 19 
VACCINE. ON ROOM AIR SATURATING WELL. KEPT BED ON LOWER LOCKED POSITION, KEPT SIDE RAILS UP 
X 2 ALL THE TIME, KEPT CALL LIGHT WITHIN AT REACH, SAFETY PRECAUTIONS MAINTAIN, WILL 
CONTINUE TO MONITOR

## 2023-05-05 NOTE — NUR
PT IN BED 12, BREATHING EVEN AND UNLABORED A/O X1 Persian SPEAKING. FEEL DOWN 
AND CAUGHT HERSELF WITH HER ARM LEADING TO  CONNECTED TO BEDSIDE MONITOR.



Hx: HTN, LDL, HYPERTHYROID, ANEMIA 

TAKES ASPIRIN 81MG ACCODING TO LADF.

## 2023-05-06 VITALS — SYSTOLIC BLOOD PRESSURE: 157 MMHG | DIASTOLIC BLOOD PRESSURE: 54 MMHG

## 2023-05-06 VITALS — SYSTOLIC BLOOD PRESSURE: 164 MMHG | DIASTOLIC BLOOD PRESSURE: 70 MMHG

## 2023-05-06 VITALS — SYSTOLIC BLOOD PRESSURE: 130 MMHG | DIASTOLIC BLOOD PRESSURE: 61 MMHG

## 2023-05-06 VITALS — SYSTOLIC BLOOD PRESSURE: 154 MMHG | DIASTOLIC BLOOD PRESSURE: 58 MMHG

## 2023-05-06 VITALS — DIASTOLIC BLOOD PRESSURE: 57 MMHG | SYSTOLIC BLOOD PRESSURE: 160 MMHG

## 2023-05-06 VITALS — SYSTOLIC BLOOD PRESSURE: 147 MMHG | DIASTOLIC BLOOD PRESSURE: 56 MMHG

## 2023-05-06 LAB
BASOPHILS # BLD AUTO: 0 K/UL (ref 0–0.2)
BASOPHILS NFR BLD AUTO: 0.5 % (ref 0–2)
BUN SERPL-MCNC: 37 MG/DL (ref 7–18)
CALCIUM SERPL-MCNC: 7.7 MG/DL (ref 8.5–10.1)
CHLORIDE SERPL-SCNC: 111 MMOL/L (ref 98–107)
CHOLEST SERPL-MCNC: 122 MG/DL (ref ?–200)
CO2 SERPL-SCNC: 22 MMOL/L (ref 21–32)
CREAT SERPL-MCNC: 1.9 MG/DL (ref 0.6–1.3)
EOSINOPHIL NFR BLD AUTO: 2.8 % (ref 0–6)
EOSINOPHIL NFR BLD MANUAL: 2 % (ref 0–4)
FERRITIN SERPL-MCNC: 689 NG/ML (ref 8–388)
GLUCOSE SERPL-MCNC: 124 MG/DL (ref 74–106)
HCT VFR BLD AUTO: 18 % (ref 33–45)
HDLC SERPL-MCNC: 63 MG/DL (ref 40–60)
HGB BLD-MCNC: 6 G/DL (ref 11.5–14.8)
IRON SERPL-MCNC: 27 UG/DL (ref 50–175)
LDLC SERPL DIRECT ASSAY-MCNC: 50 MG/DL (ref 0–99)
LYMPHOCYTES NFR BLD AUTO: 0.5 K/UL (ref 0.8–4.8)
LYMPHOCYTES NFR BLD AUTO: 17.6 % (ref 20–44)
LYMPHOCYTES NFR BLD MANUAL: 16 % (ref 16–48)
MAGNESIUM SERPL-MCNC: 2.7 MG/DL (ref 1.8–2.4)
MCHC RBC AUTO-ENTMCNC: 33 G/DL (ref 31–36)
MCV RBC AUTO: 86 FL (ref 82–100)
MONOCYTES NFR BLD AUTO: 0.3 K/UL (ref 0.1–1.3)
MONOCYTES NFR BLD AUTO: 9.9 % (ref 2–12)
MONOCYTES NFR BLD MANUAL: 5 % (ref 0–11)
NEUTROPHILS # BLD AUTO: 1.8 K/UL (ref 1.8–8.9)
NEUTROPHILS NFR BLD AUTO: 69.2 % (ref 43–81)
NEUTS SEG NFR BLD MANUAL: 77 % (ref 42–76)
PHOSPHATE SERPL-MCNC: 4.8 MG/DL (ref 2.5–4.9)
PLATELET # BLD AUTO: 69 K/UL (ref 150–450)
POTASSIUM SERPL-SCNC: 4.6 MMOL/L (ref 3.5–5.1)
RBC # BLD AUTO: 2.14 MIL/UL (ref 4–5.2)
SODIUM SERPL-SCNC: 140 MMOL/L (ref 136–145)
TIBC SERPL-MCNC: 165 UG/DL (ref 250–450)
TRIGL SERPL-MCNC: 136 MG/DL (ref 30–150)
TSH SERPL DL<=0.005 MIU/L-ACNC: 1.01 UIU/ML (ref 0.36–3.74)
WBC NRBC COR # BLD AUTO: 2.6 K/UL (ref 4.3–11)

## 2023-05-06 PROCEDURE — 30233N1 TRANSFUSION OF NONAUTOLOGOUS RED BLOOD CELLS INTO PERIPHERAL VEIN, PERCUTANEOUS APPROACH: ICD-10-PCS | Performed by: INTERNAL MEDICINE

## 2023-05-06 RX ADMIN — INSULIN HUMAN PRN UNITS: 100 INJECTION, SOLUTION PARENTERAL at 21:42

## 2023-05-06 RX ADMIN — Medication PRN MG: at 21:17

## 2023-05-06 RX ADMIN — Medication SCH EACH: at 06:31

## 2023-05-06 RX ADMIN — Medication PRN MG: at 04:45

## 2023-05-06 RX ADMIN — LINAGLIPTIN SCH MG: 5 TABLET, FILM COATED ORAL at 09:45

## 2023-05-06 RX ADMIN — LEVOTHYROXINE SODIUM SCH MCG: 75 TABLET ORAL at 08:39

## 2023-05-06 RX ADMIN — SODIUM CHLORIDE PRN MLS/HR: 9 INJECTION, SOLUTION INTRAVENOUS at 04:40

## 2023-05-06 RX ADMIN — ENOXAPARIN SODIUM SCH MG: 30 INJECTION SUBCUTANEOUS at 18:00

## 2023-05-06 RX ADMIN — Medication SCH EACH: at 17:38

## 2023-05-06 RX ADMIN — INSULIN HUMAN PRN UNITS: 100 INJECTION, SOLUTION PARENTERAL at 12:38

## 2023-05-06 RX ADMIN — ATORVASTATIN CALCIUM SCH MG: 10 TABLET, FILM COATED ORAL at 18:20

## 2023-05-06 RX ADMIN — ASPIRIN 81 MG SCH MG: 81 TABLET ORAL at 09:45

## 2023-05-06 RX ADMIN — Medication SCH EACH: at 12:36

## 2023-05-06 RX ADMIN — PANTOPRAZOLE SODIUM SCH MG: 40 TABLET, DELAYED RELEASE ORAL at 07:39

## 2023-05-06 RX ADMIN — NIFEDIPINE SCH MG: 60 TABLET, EXTENDED RELEASE ORAL at 09:45

## 2023-05-06 RX ADMIN — Medication SCH EACH: at 21:44

## 2023-05-06 RX ADMIN — INSULIN HUMAN PRN UNITS: 100 INJECTION, SOLUTION PARENTERAL at 17:48

## 2023-05-06 NOTE — NUR
RN CLOSING NOTES



PATIENT AWAKE IN BED, ON MODERATE HIGH BACK REST POSITION. WITH IV ACCESS AT #20G WITH NS 1L 
AT 75ML/HR PATENT AND INFUSING WELL.  ON ROOM AIR SATURATING WELL. NO PAIN OR DISCOMFORT 
NOTED AT THIS TIME, NO SOB OR DISTRESS NOTED. AM CARE RENDERED, ALL DUE MEDICATIONS GIVEN. 
KEPT BED ON LOWER LOCKED POSITION, PAIN MANAGEMENT MAINTAINED, ICED PACKED PLACED AT RIGHT 
SHOULDER,KEPT PATIENT WARM AND COMFORTABLE, NO ACTIVE BLEEDING NOTED, KEPT SIDE RAILS UP X 2 
ALL THE TIME, KEPT CALL LIGHT WITHIN AT REACH. SAFETY MEASURES MAINTAINED. WILL ENDORSED TO 
NEXT SHIFT FOR ABILIO.

## 2023-05-06 NOTE — NUR
RN OPENING NOTES



RECEIVED PATIENT AWAKE IN BED. A/O X 4. Mohawk SPEAKING BUT ABLE TO UNDERSTAND ENGLISH,  
ON MODERATE HIGH BACK REST POSITION. BREATHING EVENLY AND UNLABORED. WITH IV ACCESS AT LEFT 
HAND #20G WITH NS 1L AT 75ML/HR INFUSING WELL. NO SOB OR PAIN AT THIS MOMENT. ON ROOM AIR 
SATURATING WELL. KEPT BED ON LOWER LOCKED POSITION, KEPT SIDE RAILS UP X 2 ALL THE TIME, 
KEPT CALL LIGHT WITHIN REACH, SAFETY PRECAUTIONS MAINTAIN, WILL CONTINUE TO MONITOR PATIENT.

## 2023-05-06 NOTE — NUR
RN MS CLOSING NOTES



PATIENT IS IN BED, ASLEEP ON MODERATE HIGH BACK REST POSITION. WITH IV ACCESS AT #20G WITH 
NS1L AT 75ML/HR INFUSING WELL.INCONTINENT USES BED PAN FOR NOW ON ROOM AIR SATURATING WELL. 
NO PAIN OR DISCOMFORT NOTED AT THIS TIME, NO SOB OR DISTRESS NOTED. PM CARE RENDERED, ALL 
DUE MEDICATIONS GIVEN. KEPT BED ON LOWER LOCKED POSITION, PAIN MANAGEMENT MAINTAINED, ICED 
PACKED PLACED AT RIGHT SHOULDER,KEPT PATIENT WARM AND COMFORTABLE, NO ACTIVE BLEEDING NOTED, 
KEPT SIDE RAILS UP X 2 ALL THE TIME, KEPT CALL LIGHT WITHIN AT REACH. SAFETY MEASURES 
MAINTAINED. WILL ENDORSED TO NEXT SHIFT FOR ABILIO.

## 2023-05-06 NOTE — NUR
MS RN NOTE

BLOOD TRANSFUSION IS INFUSING. PT'S VS WNL: BP: 154/58, P: 78, T: 98.8, O2: 95%, R: 20. NO 
ADVERSE REACTIONS, NO ALLERGIC REACTIONS, NO FEVER NOTED AT THIS TIME. WILL CONTINUE TO 
MONITOR.

## 2023-05-06 NOTE — NUR
MS RN NOTE

BLOOD TRANSFUSION STILL RUNNING. VITAL SIGNS WNL. NO FEVER, NO ADVERSE REACTIONS OBSERVED. 
WILL CONTINUE TO MONITOR.

## 2023-05-06 NOTE — NUR
RN NOTES





RECEIVED CALL FROM LABORATORY HEMOGLOBIN OF 0.6 AND HEMATOCRIT OF 18. DR. LUJAN MADE AWARE 
WITH ORDERS MADE TO REPEAT H AND H AND CARRIED OUT. WILL CONTINUE TO MONITOR

## 2023-05-06 NOTE — NUR
MS RN NOTE

BLOOD TRANSFUSION IS STARTED. PT'S VS WNL: BP: 147/56, P: 80, T: 98.4, O2: 93%, R: 20. PT IS 
AFEBRILE. WILL CONTINUE TO MONITOR.

## 2023-05-06 NOTE — NUR
RN OPENING NOTE



RECEIVED PATIENT AWAKE IN BED. PATIENT A/O X 4, Peruvian SPEAKING BUT ABLE TO UNDERSTAND 
ENGLISH. NO RESPIRATORY DISTRESS, NO SOB, BREATHING EVENLY AND UNLABORED. IV ACCESS TO LEFT 
HAND #20G WITH NS INFUSING AT 75ML/HR, IV INTACT, AND PATENT. NO C/O DISCOMFORT OR PAIN AT 
THIS MOMENT. ON ROOM AIR, SATURATING WELL. SAFETY MEASURES MAINTAINED: BED IN LOWEST LOCKED 
POSITION, SIDE RAILS UP X 2, CALL LIGHT WITHIN REACH. WILL CONTINUE TO MONITOR PATIENT.

## 2023-05-07 VITALS — SYSTOLIC BLOOD PRESSURE: 131 MMHG | DIASTOLIC BLOOD PRESSURE: 91 MMHG

## 2023-05-07 VITALS — SYSTOLIC BLOOD PRESSURE: 134 MMHG | DIASTOLIC BLOOD PRESSURE: 59 MMHG

## 2023-05-07 VITALS — SYSTOLIC BLOOD PRESSURE: 122 MMHG | DIASTOLIC BLOOD PRESSURE: 59 MMHG

## 2023-05-07 VITALS — DIASTOLIC BLOOD PRESSURE: 65 MMHG | SYSTOLIC BLOOD PRESSURE: 149 MMHG

## 2023-05-07 VITALS — DIASTOLIC BLOOD PRESSURE: 90 MMHG | SYSTOLIC BLOOD PRESSURE: 130 MMHG

## 2023-05-07 VITALS — DIASTOLIC BLOOD PRESSURE: 64 MMHG | SYSTOLIC BLOOD PRESSURE: 134 MMHG

## 2023-05-07 VITALS — DIASTOLIC BLOOD PRESSURE: 91 MMHG | SYSTOLIC BLOOD PRESSURE: 131 MMHG

## 2023-05-07 VITALS — SYSTOLIC BLOOD PRESSURE: 170 MMHG | DIASTOLIC BLOOD PRESSURE: 63 MMHG

## 2023-05-07 VITALS — DIASTOLIC BLOOD PRESSURE: 72 MMHG | SYSTOLIC BLOOD PRESSURE: 141 MMHG

## 2023-05-07 VITALS — SYSTOLIC BLOOD PRESSURE: 157 MMHG | DIASTOLIC BLOOD PRESSURE: 64 MMHG

## 2023-05-07 LAB
ALBUMIN SERPL BCP-MCNC: 2.6 G/DL (ref 3.4–5)
ALP SERPL-CCNC: 104 U/L (ref 46–116)
ALT SERPL W P-5'-P-CCNC: 20 U/L (ref 12–78)
AST SERPL W P-5'-P-CCNC: 15 U/L (ref 15–37)
BASOPHILS # BLD AUTO: 0 K/UL (ref 0–0.2)
BASOPHILS NFR BLD AUTO: 0.8 % (ref 0–2)
BILIRUB SERPL-MCNC: 0.4 MG/DL (ref 0.2–1)
BILIRUB UR QL STRIP: NEGATIVE
BUN SERPL-MCNC: 26 MG/DL (ref 7–18)
CALCIUM SERPL-MCNC: 7.7 MG/DL (ref 8.5–10.1)
CHLORIDE SERPL-SCNC: 109 MMOL/L (ref 98–107)
CO2 SERPL-SCNC: 23 MMOL/L (ref 21–32)
COLOR UR: YELLOW
CREAT SERPL-MCNC: 1.7 MG/DL (ref 0.6–1.3)
CREAT UR-MCNC: 72.3 MG/DL (ref 30–125)
DEPRECATED SQUAMOUS URNS QL MICRO: (no result) /HPF
EOSINOPHIL NFR BLD AUTO: 3.1 % (ref 0–6)
EOSINOPHIL NFR BLD MANUAL: 2 % (ref 0–4)
GLUCOSE SERPL-MCNC: 125 MG/DL (ref 74–106)
GLUCOSE UR STRIP-MCNC: NEGATIVE MG/DL
HCT VFR BLD AUTO: 21 % (ref 33–45)
HGB BLD-MCNC: 6.9 G/DL (ref 11.5–14.8)
LEUKOCYTE ESTERASE UR QL STRIP: NEGATIVE
LYMPHOCYTES NFR BLD AUTO: 0.8 K/UL (ref 0.8–4.8)
LYMPHOCYTES NFR BLD AUTO: 22.6 % (ref 20–44)
LYMPHOCYTES NFR BLD MANUAL: 23 % (ref 16–48)
MAGNESIUM SERPL-MCNC: 2.4 MG/DL (ref 1.8–2.4)
MCHC RBC AUTO-ENTMCNC: 32 G/DL (ref 31–36)
MCV RBC AUTO: 87 FL (ref 82–100)
MONOCYTES NFR BLD AUTO: 0.4 K/UL (ref 0.1–1.3)
MONOCYTES NFR BLD AUTO: 11.4 % (ref 2–12)
MONOCYTES NFR BLD MANUAL: 11 % (ref 0–11)
NEUTROPHILS # BLD AUTO: 2.2 K/UL (ref 1.8–8.9)
NEUTROPHILS NFR BLD AUTO: 62.1 % (ref 43–81)
NEUTS SEG NFR BLD MANUAL: 64 % (ref 42–76)
NITRITE UR QL STRIP: NEGATIVE
PH UR STRIP: 5.5 [PH] (ref 5–8)
PHOSPHATE SERPL-MCNC: 4 MG/DL (ref 2.5–4.9)
PLATELET # BLD AUTO: 75 K/UL (ref 150–450)
POTASSIUM SERPL-SCNC: 4.2 MMOL/L (ref 3.5–5.1)
PROT SERPL-MCNC: 5.8 G/DL (ref 6.4–8.2)
PROT UR QL STRIP: (no result) MG/DL
RBC # BLD AUTO: 2.44 MIL/UL (ref 4–5.2)
RBC #/AREA URNS HPF: (no result) /HPF (ref 0–2)
SODIUM SERPL-SCNC: 141 MMOL/L (ref 136–145)
SODIUM UR-SCNC: 88 MMOL/L (ref 40–220)
UROBILINOGEN UR STRIP-MCNC: 0.2 EU/DL
WBC #/AREA URNS HPF: (no result) /HPF (ref 0–3)
WBC NRBC COR # BLD AUTO: 3.5 K/UL (ref 4.3–11)

## 2023-05-07 RX ADMIN — ACETAMINOPHEN PRN MG: 325 TABLET ORAL at 09:03

## 2023-05-07 RX ADMIN — SODIUM CHLORIDE SCH MLS/HR: 9 INJECTION, SOLUTION INTRAVENOUS at 13:28

## 2023-05-07 RX ADMIN — ACETAMINOPHEN PRN MG: 325 TABLET ORAL at 13:43

## 2023-05-07 RX ADMIN — ATORVASTATIN CALCIUM SCH MG: 10 TABLET, FILM COATED ORAL at 17:52

## 2023-05-07 RX ADMIN — PANTOPRAZOLE SODIUM SCH MG: 40 TABLET, DELAYED RELEASE ORAL at 09:03

## 2023-05-07 RX ADMIN — INSULIN HUMAN PRN UNITS: 100 INJECTION, SOLUTION PARENTERAL at 18:15

## 2023-05-07 RX ADMIN — Medication SCH EACH: at 07:05

## 2023-05-07 RX ADMIN — LEVOTHYROXINE SODIUM SCH MCG: 75 TABLET ORAL at 09:02

## 2023-05-07 RX ADMIN — ENOXAPARIN SODIUM SCH MG: 30 INJECTION SUBCUTANEOUS at 17:48

## 2023-05-07 RX ADMIN — SODIUM CHLORIDE PRN MLS/HR: 9 INJECTION, SOLUTION INTRAVENOUS at 08:11

## 2023-05-07 RX ADMIN — Medication PRN MG: at 02:31

## 2023-05-07 RX ADMIN — NIFEDIPINE SCH MG: 60 TABLET, EXTENDED RELEASE ORAL at 08:52

## 2023-05-07 RX ADMIN — INSULIN HUMAN PRN UNITS: 100 INJECTION, SOLUTION PARENTERAL at 21:54

## 2023-05-07 RX ADMIN — LINAGLIPTIN SCH MG: 5 TABLET, FILM COATED ORAL at 08:52

## 2023-05-07 RX ADMIN — Medication SCH EACH: at 11:35

## 2023-05-07 RX ADMIN — INSULIN HUMAN PRN UNITS: 100 INJECTION, SOLUTION PARENTERAL at 07:06

## 2023-05-07 RX ADMIN — Medication SCH EACH: at 21:51

## 2023-05-07 RX ADMIN — Medication SCH EACH: at 17:55

## 2023-05-07 RX ADMIN — ASPIRIN 81 MG SCH MG: 81 TABLET ORAL at 08:50

## 2023-05-07 NOTE — NUR
LVN Note



VS: BP: 141/72, P: 72, O2: 96% RA, T: 98.2F. Patient is stable, no s/s of reaction noted.

## 2023-05-07 NOTE — NUR
MS LVN INITIAL NOTES

 Received report from am nurse and checked patient , she's alert oriented Lithuanian speaking 
woman , with blood  transfusion still infusing at this time, no adverse reaction noted. 
Respiration even and non-labored, she have sling on her right arm and per am nurse she have 
fracture . Pt denies any pain or any discomfort at this time. re-orient her how to used the 
call light system and at the same time encourage her to use if she needs some help of needs 
the nurse. Kept her warm and comfortable at all times. Bed in low and lock in position with 
side rails x2 up . will continue monitoring.

## 2023-05-07 NOTE — NUR
RN NOTE



Patient remains stable with VS as follows: /59, HR 77, RR 19, Temp 98.1, SPO2 94% on 
room air. No transfusion reactions noted. Will continue to monitor patient.

## 2023-05-07 NOTE — NUR
MS RN NOTE

BLOOD TRANSFUSION ENDED. VITAL SIGNS WNL. NO FEVER, NO ALLERGIC REACTIONS OBSERVED. NO SOB, 
NO RESPIRATORY DISTRESS NOTED DURING BLOOD TRANSFUSION.

## 2023-05-07 NOTE — NUR
RN NOTE



Patient remains stable, no sign of transfusion reactions noted.. VS as follows: /64, 
HR 74, RR 18, Temp 98.2, SPO2 94% on room air. Will endorse to night shift nurse for ABILIO.

## 2023-05-07 NOTE — NUR
RN NOTE



!st unit PRBC transfusion ended. No transfusion reactions noted on 1st PRBC bag. 2nd unit 
PRBC picked up from blood bank. Verified with 2 RNs, no discoloration, no clots, no leakage 
noted on blood. VS taken as follow: /90, HR 74, RR 18, Temp 98, SPO2 94% on room air. 
2nd bag of PRBC transfusion started. Will continue to monitor.

## 2023-05-07 NOTE — NUR
RN NOTE



Received order for blood transfusion. Blood picked up at blood bank, no discoloration, no 
clots, no leakage noted. Verified with 2 RNs. VS prior to transfusion as follows: /91, 
HR 99, RR 19, Temp 97.5, SPO2 99% on room air. Blood transfusion started. Will continue to 
monitor.

## 2023-05-07 NOTE — NUR
MS LVN NOTES

 Blood sugar checked done 128, no insulin due at this time. no signs of hypo glycemia noted. 
kept her comfortable at all times. Blood transfusion still infusing. no signs of any adverse 
reaction noted . will continue monitoring.

## 2023-05-07 NOTE — NUR
LVN Opening Note



Received patient on bed, awake, A/O X 4, RA, Tanzanian speaking. IV access #20 on Left in 
placed, NS infusing at 75 ml/h, patent. Safety measures in placed, bed low, locked, 
siderails up x2, call light at reach. Will continue to monitor patient.

## 2023-05-07 NOTE — NUR
LVN Closing Note



Patient on bed, awake, A/O X 4, RA, Mauritian speaking. IV access #20 on Left in placed,  
infusing blood, patent, tolerated, no s/s of reaction noted/infiltration. Safety measures in 
placed, bed low, locked, side rails up x2, call light at reach. Will endorse next shift 
nurse to monitor patient.

## 2023-05-07 NOTE — NUR
MS LVN NOTES

 Blood transfusion done no adverse reaction noted, vital signs stable. no signs of any 
distress noted or any discomfort. kept her warm and comfortable at all times. will continue 
monitoring. place call light at reach.

## 2023-05-07 NOTE — NUR
MS RN OPENING NOTE



LEFT PATIENT AWAKE IN BED. PATIENT A/O X 4, Citizen of the [place] SPEAKING BUT ABLE TO UNDERSTAND 
ENGLISH. NO RESPIRATORY DISTRESS, NO SOB, BREATHING EVENLY AND UNLABORED. IV ACCESS TO LEFT 
HAND #20G WITH NS INFUSING AT 75ML/HR, IV INTACT, AND PATENT. NO C/O DISCOMFORT OR PAIN AT 
THIS MOMENT. ON ROOM AIR, SATURATING WELL. JASPER FROM LAB CALLED TO INFORM THAT PT'S HGB IS 
6.9, AND HTC IS 21. SAFETY MEASURES MAINTAINED: BED IN LOWEST LOCKED POSITION, SIDE RAILS UP 
X 2, CALL LIGHT WITHIN REACH. WILL ENDORSE PATIENT TO AM SHIFT NURSE FOR ABILIO.

## 2023-05-08 ENCOUNTER — HOSPITAL ENCOUNTER (INPATIENT)
Dept: HOSPITAL 12 - REHABOV3 | Age: 83
LOS: 16 days | Discharge: NURSING, REHAB OR LONG-TERM CARE | DRG: 559 | End: 2023-05-24
Attending: PHYSICAL MEDICINE & REHABILITATION | Admitting: PHYSICAL MEDICINE & REHABILITATION
Payer: MEDICARE

## 2023-05-08 VITALS — SYSTOLIC BLOOD PRESSURE: 154 MMHG | DIASTOLIC BLOOD PRESSURE: 59 MMHG

## 2023-05-08 VITALS — DIASTOLIC BLOOD PRESSURE: 57 MMHG | SYSTOLIC BLOOD PRESSURE: 153 MMHG

## 2023-05-08 VITALS
DIASTOLIC BLOOD PRESSURE: 60 MMHG | WEIGHT: 161 LBS | HEIGHT: 63 IN | BODY MASS INDEX: 28.53 KG/M2 | SYSTOLIC BLOOD PRESSURE: 152 MMHG

## 2023-05-08 DIAGNOSIS — N18.9: ICD-10-CM

## 2023-05-08 DIAGNOSIS — D68.59: ICD-10-CM

## 2023-05-08 DIAGNOSIS — E78.5: ICD-10-CM

## 2023-05-08 DIAGNOSIS — E66.9: ICD-10-CM

## 2023-05-08 DIAGNOSIS — S42.214D: ICD-10-CM

## 2023-05-08 DIAGNOSIS — I50.32: ICD-10-CM

## 2023-05-08 DIAGNOSIS — E11.22: ICD-10-CM
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
## 2023-05-08 DIAGNOSIS — E44.0: ICD-10-CM

## 2023-05-08 DIAGNOSIS — S32.511D: Primary | ICD-10-CM

## 2023-05-08 DIAGNOSIS — D50.9: ICD-10-CM

## 2023-05-08 DIAGNOSIS — I31.39: ICD-10-CM

## 2023-05-08 DIAGNOSIS — E03.9: ICD-10-CM

## 2023-05-08 DIAGNOSIS — E11.9: ICD-10-CM

## 2023-05-08 DIAGNOSIS — K21.9: ICD-10-CM

## 2023-05-08 DIAGNOSIS — I34.0: ICD-10-CM

## 2023-05-08 DIAGNOSIS — N17.0: ICD-10-CM

## 2023-05-08 DIAGNOSIS — J45.909: ICD-10-CM

## 2023-05-08 DIAGNOSIS — K74.60: ICD-10-CM

## 2023-05-08 DIAGNOSIS — I10: ICD-10-CM

## 2023-05-08 DIAGNOSIS — I13.0: ICD-10-CM

## 2023-05-08 DIAGNOSIS — D69.6: ICD-10-CM

## 2023-05-08 LAB
ALBUMIN SERPL BCP-MCNC: 2.7 G/DL (ref 3.4–5)
ALP SERPL-CCNC: 114 U/L (ref 46–116)
ALT SERPL W P-5'-P-CCNC: 19 U/L (ref 12–78)
AST SERPL W P-5'-P-CCNC: 33 U/L (ref 15–37)
BASOPHILS # BLD AUTO: 0 K/UL (ref 0–0.2)
BASOPHILS NFR BLD AUTO: 0.7 % (ref 0–2)
BILIRUB SERPL-MCNC: 1 MG/DL (ref 0.2–1)
BUN SERPL-MCNC: 26 MG/DL (ref 7–18)
CALCIUM SERPL-MCNC: 8.2 MG/DL (ref 8.5–10.1)
CHLORIDE SERPL-SCNC: 110 MMOL/L (ref 98–107)
CO2 SERPL-SCNC: 19 MMOL/L (ref 21–32)
CREAT SERPL-MCNC: 1.5 MG/DL (ref 0.6–1.3)
EOSINOPHIL NFR BLD AUTO: 1.6 % (ref 0–6)
GLUCOSE SERPL-MCNC: 125 MG/DL (ref 74–106)
HCT VFR BLD AUTO: 30 % (ref 33–45)
HGB BLD-MCNC: 10 G/DL (ref 11.5–14.8)
LYMPHOCYTES NFR BLD AUTO: 0.7 K/UL (ref 0.8–4.8)
LYMPHOCYTES NFR BLD AUTO: 12.4 % (ref 20–44)
MAGNESIUM SERPL-MCNC: 2.7 MG/DL (ref 1.8–2.4)
MCHC RBC AUTO-ENTMCNC: 33 G/DL (ref 31–36)
MCV RBC AUTO: 87 FL (ref 82–100)
MONOCYTES NFR BLD AUTO: 0.5 K/UL (ref 0.1–1.3)
MONOCYTES NFR BLD AUTO: 8.2 % (ref 2–12)
NEUTROPHILS # BLD AUTO: 4.3 K/UL (ref 1.8–8.9)
NEUTROPHILS NFR BLD AUTO: 77.1 % (ref 43–81)
PHOSPHATE SERPL-MCNC: 4.4 MG/DL (ref 2.5–4.9)
PLATELET # BLD AUTO: 85 K/UL (ref 150–450)
POTASSIUM SERPL-SCNC: 4.6 MMOL/L (ref 3.5–5.1)
PROT SERPL-MCNC: 6.5 G/DL (ref 6.4–8.2)
RBC # BLD AUTO: 3.5 MIL/UL (ref 4–5.2)
SODIUM SERPL-SCNC: 141 MMOL/L (ref 136–145)
WBC NRBC COR # BLD AUTO: 5.5 K/UL (ref 4.3–11)

## 2023-05-08 RX ADMIN — INSULIN HUMAN PRN UNITS: 100 INJECTION, SOLUTION PARENTERAL at 17:22

## 2023-05-08 RX ADMIN — LINAGLIPTIN SCH MG: 5 TABLET, FILM COATED ORAL at 09:47

## 2023-05-08 RX ADMIN — NIFEDIPINE SCH MG: 60 TABLET, EXTENDED RELEASE ORAL at 09:47

## 2023-05-08 RX ADMIN — ASPIRIN 81 MG SCH MG: 81 TABLET ORAL at 09:47

## 2023-05-08 RX ADMIN — ENOXAPARIN SODIUM SCH MG: 30 INJECTION SUBCUTANEOUS at 17:39

## 2023-05-08 RX ADMIN — INSULIN HUMAN PRN UNITS: 100 INJECTION, SOLUTION PARENTERAL at 12:19

## 2023-05-08 RX ADMIN — Medication SCH EACH: at 17:20

## 2023-05-08 RX ADMIN — Medication SCH EACH: at 12:18

## 2023-05-08 RX ADMIN — SODIUM CHLORIDE SCH MLS/HR: 9 INJECTION, SOLUTION INTRAVENOUS at 14:15

## 2023-05-08 RX ADMIN — ATORVASTATIN CALCIUM SCH MG: 10 TABLET, FILM COATED ORAL at 17:38

## 2023-05-08 RX ADMIN — PANTOPRAZOLE SODIUM SCH MG: 40 TABLET, DELAYED RELEASE ORAL at 08:04

## 2023-05-08 RX ADMIN — LEVOTHYROXINE SODIUM SCH MCG: 75 TABLET ORAL at 08:04

## 2023-05-08 NOTE — NUR
MS RN CLOSING NOTES 



PATIENT AWAKE IN BED. ON MODERATE HIGH BACK REST POSITION. WITH IV ACCESS AT #20G WITH NS1L 
AT 75ML/HR INFUSING. NO PAIN OR DISCOMFORT NOTED AT THIS TIME, NO SOB OR DISTRESS NOTED. FOR 
DC AT [facility]. AM CARE RENDERED, ALL DUE MEDICATIONS GIVEN. KEPT BED ON LOWER LOCKED 
POSITION, PAIN MANAGEMENT MAINTAINED, KEPT PATIENT WARM AND COMFORTABLE, NO ACTIVE BLEEDING 
NOTED, KEPT SIDE RAILS UP X 2 ALL THE TIME, KEPT CALL LIGHT WITHIN REACH. SAFETY MEASURES 
MAINTAINED. WILL ENDORSED TO THE NIGHT SHIFT NURSE FOR CONTINUITY OF CARE.

## 2023-05-08 NOTE — NUR
Received  82 y/o female pt who BIB ambulance from [facility], [facility]. Pt is being admitted to ARU. Per 
report from TRISTON Wilder, chart review and family, pt fell at home and sustained a right femur 
fracture. Pt did not have Surgery. Pt is currently bedrest. 



Pt is Panamanian speaking but AAO4. Pt. has NKA. Pt is on a CCHO diet.



Pt c/o pain and was given a Tylenol as per order.



Dr. Silva was notified via text message. [facility] provider and ON CALL were also notified fro 
med reconciliation.

## 2023-05-08 NOTE — NUR
RN OPENING NOTE



RECEIVED PATIENT AWAKE IN BED. PATIENT A/O X 4, Serbian SPEAKING BUT ABLE TO UNDERSTAND AND 
SPEAK LITTLE ENGLISH. NO RESPIRATORY DISTRESS AND NO SOB AT THIS MOMENT, NO COMPLAINT OF 
PAIN, BREATHING EVENLY AND UNLABORED. IV ACCESS TO LEFT HAND #20G WITH NS INFUSING AT 
75ML/HR, IV INTACT, PATENT, AND INFUSING WELL. NO C/O DISCOMFORT OR PAIN AT THIS MOMENT. ON 
ROOM AIR, SATURATING WELL. SAFETY MEASURES MAINTAINED: BED IN LOWEST LOCKED POSITION, SIDE 
RAILS UP X 2, CALL LIGHT WITHIN REACH. WILL CONTINUE TO MONITOR PATIENT.

## 2023-05-08 NOTE — NUR
RN DISCHARGE NOTE



PT IS AWAKE  AND ALERT X 4, RESPONSIVE AND FOLLOWS VERBAL COMMAND. PT ID BAND AND IV LINE IS 
OUT. PT DIAPER CHANGED, KEPT CLEAN AND DRY. PT IS ON RA W/ NO S & SX OF RESPIRATORY 
DISTRESS. PT HAS  SLING ON THE RIGHT ARM. PT VITALS SIGN WAS  TAKEN PRIOR TO DISCHARGE, 
STABLE @ /64, PULSE RATE 84BPM, RESPIRATION RATE 19, TEMPERATURE 98.5 AND O2 SAT OF 
92%. PT BELONGINGS WAS ENDORSED AND GIVEN TO THE PT.  TRANSFERRED IN A GURNEY BY THE EMT AND 
TRANSFERRED TO HEATHER HA, MODESTO LOMBARDI AND MARGARETTE FIELDS.

## 2023-05-08 NOTE — NUR
MS LVN CLOSING NOTES

 PATIENT IS IN BED, ASLEEP ON MODERATE HIGH BACK REST POSITION. WITH IV ACCESS AT #20G WITH 
NS1L AT 75ML/HR INFUSING. NO PAIN OR DISCOMFORT NOTED AT THIS TIME, NO SOB OR DISTRESS 
NOTED. PM CARE RENDERED, ALL DUE MEDICATIONS GIVEN. KEPT BED ON LOWER LOCKED POSITION, PAIN 
MANAGEMENT MAINTAINED, KEPT PATIENT WARM AND COMFORTABLE, NO ACTIVE BLEEDING NOTED, KEPT 
SIDE RAILS UP X 2 ALL THE TIME, KEPT CALL LIGHT WITHIN AT REACH. SAFETY MEASURES MAINTAINED. 
WILL ENDORSED TO AM NURSE FOR CONTINUITY OF CARE.

## 2023-05-08 NOTE — NUR
RN OPENING  NOTE



RECEIVED PT AWAKE IN BED. A/O X 4, Pashto SPEAKING BUT CAN UNDERSTAND AND SPEAK SIMPLE 
ENGLISH. PT IS BALE TO MAKE NEEDS KNOWN. PT IS IN RA, TOLERATING WELL, BREATHING EVEN AND 
UNLABORED @ THIS TIME. PT IV PRESENT ON LEFT HAND #20G RUNNING NS @ 75MLS/HR, PATENT, INTACT 
AND FLUSHES WELL. W/ NO S & SX OF INFILTRATION @ SITE NOTED. PT IS ON PURE WICK DRAINING 
YELLOW COLORED URINE AND DIAPER. PT WILL DISCHARGE TO [facility]. [facility] 
RECEIVED REPORT FROM NURSE WATKINS. SAFETY MEASURES IS IN PLACE. BED IN LOWEST AND LOCKED 
POSITION. SIDE RAILS UP X 2. BEDSIDE TABLE AND CALL LIGHT IS EASY REACH. BED ALARM IS ON. 
WILL CONTINUE TO MONITOR PT ACCORDINGLY. 

-------------------------------------------------------------------------------

Addendum: 05/08/23 at 2013 by DARCIE GRIFFIN RN

-------------------------------------------------------------------------------

TIME ERROR, OPENING NOTES @1900.

## 2023-05-09 VITALS — DIASTOLIC BLOOD PRESSURE: 66 MMHG | SYSTOLIC BLOOD PRESSURE: 139 MMHG

## 2023-05-09 VITALS — SYSTOLIC BLOOD PRESSURE: 157 MMHG | DIASTOLIC BLOOD PRESSURE: 64 MMHG

## 2023-05-09 VITALS — DIASTOLIC BLOOD PRESSURE: 64 MMHG | SYSTOLIC BLOOD PRESSURE: 152 MMHG

## 2023-05-09 VITALS — DIASTOLIC BLOOD PRESSURE: 60 MMHG | SYSTOLIC BLOOD PRESSURE: 151 MMHG

## 2023-05-09 RX ADMIN — Medication SCH EACH: at 21:24

## 2023-05-09 RX ADMIN — Medication SCH EACH: at 16:49

## 2023-05-09 RX ADMIN — Medication SCH EACH: at 06:51

## 2023-05-09 RX ADMIN — Medication SCH EACH: at 12:02

## 2023-05-09 RX ADMIN — Medication PRN MG: at 09:39

## 2023-05-09 RX ADMIN — ASPIRIN SCH MG: 81 TABLET, COATED ORAL at 11:04

## 2023-05-09 RX ADMIN — DOCUSATE SODIUM SCH MG: 100 CAPSULE, LIQUID FILLED ORAL at 21:19

## 2023-05-09 RX ADMIN — ACETAMINOPHEN PRN MG: 325 TABLET ORAL at 21:33

## 2023-05-09 RX ADMIN — ANORECTAL OINTMENT SCH GM: 15.7; .44; 24; 20.6 OINTMENT TOPICAL at 21:21

## 2023-05-09 RX ADMIN — ANORECTAL OINTMENT SCH GM: 15.7; .44; 24; 20.6 OINTMENT TOPICAL at 09:43

## 2023-05-09 RX ADMIN — NIFEDIPINE SCH MG: 60 TABLET, FILM COATED, EXTENDED RELEASE ORAL at 11:04

## 2023-05-09 RX ADMIN — ACETAMINOPHEN PRN MG: 325 TABLET ORAL at 13:59

## 2023-05-09 RX ADMIN — ACETAMINOPHEN PRN MG: 325 TABLET ORAL at 01:40

## 2023-05-09 NOTE — NUR
RECEIVED REPORT FROM NOC SHIFT RN. PATIENT IS ALERT AND ORIENTED X4. VITAL SIGNS STABLE. 
PATIENT TOLERATES PO MEDICATIONS AND DIET WELL. PATIENT PARTICIPATES WITH PHYSICAL AND 
OCCUPATIONAL THERAPY. ENCOURAGES PATIENT TO USE BEDSIDE COMMODE. PATIENT ABLE TO TRANSITION 
TO BEDSIDE COMMODE WITH MODERATE ASSIST, HOWEVER HAD PAIN. RN GAVE PAIN MEDICATIONS AS 
ORDERED BY MD. PATIENT'S FAMILY VISITED. NO ACUTE DISTRESS. ALL NEEDS MET AT THIS TIME. CALL 
LIGHT WITHIN REACH. FALL PRECAUTIONS IN PLACE. RN ENDORSED CONTINUATION OF CARE TO NOC SHIFT 
RN FOR THE CONTINUATION OF CARE.

## 2023-05-09 NOTE — NUR
Upon skin assessment pt is noted to have some skin discoloration/bruising on her right elbow 
and on her right hip. Pictures taken and documented.

## 2023-05-10 VITALS — SYSTOLIC BLOOD PRESSURE: 110 MMHG | DIASTOLIC BLOOD PRESSURE: 57 MMHG

## 2023-05-10 VITALS — DIASTOLIC BLOOD PRESSURE: 54 MMHG | SYSTOLIC BLOOD PRESSURE: 144 MMHG

## 2023-05-10 VITALS — DIASTOLIC BLOOD PRESSURE: 62 MMHG | SYSTOLIC BLOOD PRESSURE: 158 MMHG

## 2023-05-10 VITALS — SYSTOLIC BLOOD PRESSURE: 162 MMHG | DIASTOLIC BLOOD PRESSURE: 65 MMHG

## 2023-05-10 LAB
ALP SERPL-CCNC: 132 U/L (ref 50–136)
ALT SERPL W/O P-5'-P-CCNC: 20 U/L (ref 14–59)
AST SERPL-CCNC: 20 U/L (ref 15–37)
BILIRUB SERPL-MCNC: 0.9 MG/DL (ref 0.2–1)
BUN SERPL-MCNC: 34 MG/DL (ref 7–18)
CHLORIDE SERPL-SCNC: 106 MMOL/L (ref 98–107)
CHOLEST SERPL-MCNC: 167 MG/DL (ref ?–200)
CO2 SERPL-SCNC: 24 MMOL/L (ref 21–32)
CREAT SERPL-MCNC: 1.7 MG/DL (ref 0.6–1.3)
GLUCOSE SERPL-MCNC: 133 MG/DL (ref 74–106)
HCT VFR BLD AUTO: 29.3 % (ref 31.2–41.9)
HDLC SERPL-MCNC: 66 MG/DL (ref 40–60)
MAGNESIUM SERPL-MCNC: 2.3 MG/DL (ref 1.8–2.4)
MCH RBC QN AUTO: 29.5 UUG (ref 24.7–32.8)
MCV RBC AUTO: 87.8 FL (ref 75.5–95.3)
PHOSPHATE SERPL-MCNC: 4 MG/DL (ref 2.5–4.9)
PLATELET # BLD AUTO: 101 K/UL (ref 179–408)
POTASSIUM SERPL-SCNC: 4 MMOL/L (ref 3.5–5.1)
TRIGL SERPL-MCNC: 163 MG/DL (ref 30–150)
TSH SERPL DL<=0.005 MIU/L-ACNC: 4.14 MIU/ML (ref 0.36–3.74)
WS STN SPEC: 6.8 G/DL (ref 6.4–8.2)

## 2023-05-10 RX ADMIN — Medication SCH EACH: at 06:32

## 2023-05-10 RX ADMIN — Medication SCH ML: at 08:33

## 2023-05-10 RX ADMIN — NIFEDIPINE SCH MG: 60 TABLET, FILM COATED, EXTENDED RELEASE ORAL at 08:33

## 2023-05-10 RX ADMIN — ANORECTAL OINTMENT SCH GM: 15.7; .44; 24; 20.6 OINTMENT TOPICAL at 08:33

## 2023-05-10 RX ADMIN — ENOXAPARIN SODIUM SCH MG: 30 INJECTION SUBCUTANEOUS at 20:19

## 2023-05-10 RX ADMIN — ATORVASTATIN CALCIUM SCH MG: 10 TABLET, FILM COATED ORAL at 20:18

## 2023-05-10 RX ADMIN — Medication PRN MG: at 12:35

## 2023-05-10 RX ADMIN — Medication SCH EACH: at 11:30

## 2023-05-10 RX ADMIN — DOCUSATE SODIUM SCH MG: 100 CAPSULE, LIQUID FILLED ORAL at 20:18

## 2023-05-10 RX ADMIN — LINAGLIPTIN SCH MG: 5 TABLET, FILM COATED ORAL at 08:33

## 2023-05-10 RX ADMIN — PANTOPRAZOLE SODIUM SCH MG: 40 TABLET, DELAYED RELEASE ORAL at 06:26

## 2023-05-10 RX ADMIN — ANORECTAL OINTMENT SCH GM: 15.7; .44; 24; 20.6 OINTMENT TOPICAL at 20:20

## 2023-05-10 RX ADMIN — ASPIRIN SCH MG: 81 TABLET, COATED ORAL at 08:33

## 2023-05-10 RX ADMIN — LEVOTHYROXINE SODIUM SCH MCG: 150 TABLET ORAL at 06:27

## 2023-05-10 NOTE — NUR
PATIENT IN STABLE CONDITIONS, NO CHANGES NOTED. MEDICATED PRN FOR PAIN BASED ON PAIN LEVEL 
NEEDS AND AS ORDERED BY MD WITH HELP. NO S/S OF HYPO/HTN OR HYPO/HYPERGLYCEMIA NOTED. 
ASSISTED WITH ADLS AND AT ALL TIMES. ROUTINE ROUNDS AND FREQUENT VISUAL CHECKS DONE. ALL 
NEEDS ATTENDED WELL AND MET.

## 2023-05-10 NOTE — NUR
0730-PATIENT IN BED, ASLEEP, NO PHYSICAL OR RESPIRATORY DISTRESS NOTED.  JI WITH SLING IN 
PLACE,  NO SKIN IMPAIRMENT NOTED. PATIENT WAKES UP ON VERBAL COMMANDS, DENIES PAIN. NO S/S 
OF HYPO/HTN  OR HYPO/HYPERGLYCEMIA NOTED. ASSIST/ORAL FLUIDS OFFERED AND DECLINED AT THIS 
TIME. CALL LIGHT AT REACH, ENCOURAGED TO USE IT EVERY TIME HELP IS NEEDED.



0900-SCHEDULED MEDICATION ADMINISTERED WITH NO ASE NOTED, ORAL FLUIDS TAKEN WELL.

## 2023-05-11 VITALS — SYSTOLIC BLOOD PRESSURE: 162 MMHG | DIASTOLIC BLOOD PRESSURE: 64 MMHG

## 2023-05-11 VITALS — SYSTOLIC BLOOD PRESSURE: 131 MMHG | DIASTOLIC BLOOD PRESSURE: 53 MMHG

## 2023-05-11 VITALS — DIASTOLIC BLOOD PRESSURE: 62 MMHG | SYSTOLIC BLOOD PRESSURE: 169 MMHG

## 2023-05-11 RX ADMIN — LEVOTHYROXINE SODIUM SCH MCG: 150 TABLET ORAL at 04:29

## 2023-05-11 RX ADMIN — Medication PRN MG: at 20:12

## 2023-05-11 RX ADMIN — Medication SCH ML: at 10:07

## 2023-05-11 RX ADMIN — ENOXAPARIN SODIUM SCH MG: 30 INJECTION SUBCUTANEOUS at 20:12

## 2023-05-11 RX ADMIN — ATORVASTATIN CALCIUM SCH MG: 10 TABLET, FILM COATED ORAL at 20:12

## 2023-05-11 RX ADMIN — PANTOPRAZOLE SODIUM SCH MG: 40 TABLET, DELAYED RELEASE ORAL at 04:29

## 2023-05-11 RX ADMIN — Medication PRN MG: at 14:01

## 2023-05-11 RX ADMIN — ANORECTAL OINTMENT SCH GM: 15.7; .44; 24; 20.6 OINTMENT TOPICAL at 20:11

## 2023-05-11 RX ADMIN — DOCUSATE SODIUM SCH MG: 100 CAPSULE, LIQUID FILLED ORAL at 20:12

## 2023-05-11 RX ADMIN — ANORECTAL OINTMENT SCH GM: 15.7; .44; 24; 20.6 OINTMENT TOPICAL at 10:07

## 2023-05-11 RX ADMIN — NIFEDIPINE SCH MG: 60 TABLET, FILM COATED, EXTENDED RELEASE ORAL at 07:57

## 2023-05-11 RX ADMIN — LINAGLIPTIN SCH MG: 5 TABLET, FILM COATED ORAL at 07:57

## 2023-05-11 RX ADMIN — Medication PRN MG: at 15:14

## 2023-05-11 RX ADMIN — Medication PRN MG: at 07:58

## 2023-05-11 RX ADMIN — ASPIRIN SCH MG: 81 TABLET, COATED ORAL at 07:57

## 2023-05-11 NOTE — NUR
/64, HR 84 bpm. Denies any s/s of hypertension. Hydralazine 100 mg given early as 
scheduled. Will endorse to morning shift nurse accordingly. Will monitor.

## 2023-05-12 VITALS — DIASTOLIC BLOOD PRESSURE: 62 MMHG | SYSTOLIC BLOOD PRESSURE: 165 MMHG

## 2023-05-12 VITALS — SYSTOLIC BLOOD PRESSURE: 159 MMHG | DIASTOLIC BLOOD PRESSURE: 54 MMHG

## 2023-05-12 RX ADMIN — DOCUSATE SODIUM SCH MG: 100 CAPSULE, LIQUID FILLED ORAL at 20:59

## 2023-05-12 RX ADMIN — Medication SCH MG: at 21:07

## 2023-05-12 RX ADMIN — LINAGLIPTIN SCH MG: 5 TABLET, FILM COATED ORAL at 09:02

## 2023-05-12 RX ADMIN — ATORVASTATIN CALCIUM SCH MG: 10 TABLET, FILM COATED ORAL at 21:00

## 2023-05-12 RX ADMIN — Medication PRN MG: at 09:06

## 2023-05-12 RX ADMIN — Medication SCH MG: at 09:05

## 2023-05-12 RX ADMIN — ANORECTAL OINTMENT SCH GM: 15.7; .44; 24; 20.6 OINTMENT TOPICAL at 21:12

## 2023-05-12 RX ADMIN — LEVOTHYROXINE SODIUM SCH MCG: 150 TABLET ORAL at 06:09

## 2023-05-12 RX ADMIN — ACETAMINOPHEN PRN MG: 325 TABLET ORAL at 21:11

## 2023-05-12 RX ADMIN — Medication SCH ML: at 09:03

## 2023-05-12 RX ADMIN — ENOXAPARIN SODIUM SCH MG: 30 INJECTION SUBCUTANEOUS at 21:08

## 2023-05-12 RX ADMIN — PANTOPRAZOLE SODIUM SCH MG: 40 TABLET, DELAYED RELEASE ORAL at 06:09

## 2023-05-12 RX ADMIN — ANORECTAL OINTMENT SCH GM: 15.7; .44; 24; 20.6 OINTMENT TOPICAL at 09:03

## 2023-05-12 RX ADMIN — ACETAMINOPHEN PRN MG: 325 TABLET ORAL at 14:06

## 2023-05-12 RX ADMIN — NIFEDIPINE SCH MG: 60 TABLET, FILM COATED, EXTENDED RELEASE ORAL at 09:02

## 2023-05-12 RX ADMIN — ASPIRIN SCH MG: 81 TABLET, COATED ORAL at 09:02

## 2023-05-12 NOTE — NUR
Patient in bed alert and verbal with difficulty to communicate her needs due to language 
barrier. Right arm sling in place, circulation and skin check as per protocol. Tylenol given 
for pain patient kept comfortable, call light at reach.

-------------------------------------------------------------------------------

Addendum: 05/13/23 at 0326 by REGISTRY [facility] INPATIENT RN8 RN

-------------------------------------------------------------------------------

Patient platelet count is 101 and is on lovenox 30 mg SQ QHS. Jae HARMON made aware she 
says it is okay to give as long as the patient is not bleeding.

## 2023-05-12 NOTE — NUR
Patient A/Ox3, Vietnamese speaking and some English.  provided as needed, patient 
verbalizes simple needs and follows directions, cooperative with her care/nursing staff and 
compliant with her medication & treatment. No s/s of hypo/hyperglycemia or hypo/HTN noted. 
Patient tolerating  meals fairly. Drinks her fluids well and as offered, no swallowing 
problems noted. Assisted with her ADLs/personal care/hygiene and as needed through out the 
shift. Patient continues under rehab for PT/OT skilled services as ordered, OOB for her 
therapy twice during shift, actively able to participate in therapy and lesia. therapy fairly. 
Medicated patient PRN for pain based on pain level needs and as ordered by MD., Patient 
incontinent of both, diaper changed at routine intervals and as needed; patient was seen by 
Dr. Silva, rehab MD. Patient with RT humerus and RT pelvic Fx. handled gently and 
carefully during care. All safety precautions observed. Routine rounds and frequent visual 
checks done. All needs attended well and met promptly and timely.

## 2023-05-13 VITALS — DIASTOLIC BLOOD PRESSURE: 47 MMHG | SYSTOLIC BLOOD PRESSURE: 133 MMHG

## 2023-05-13 VITALS — SYSTOLIC BLOOD PRESSURE: 146 MMHG | DIASTOLIC BLOOD PRESSURE: 55 MMHG

## 2023-05-13 VITALS — SYSTOLIC BLOOD PRESSURE: 126 MMHG | DIASTOLIC BLOOD PRESSURE: 68 MMHG

## 2023-05-13 VITALS — DIASTOLIC BLOOD PRESSURE: 48 MMHG | SYSTOLIC BLOOD PRESSURE: 128 MMHG

## 2023-05-13 RX ADMIN — Medication SCH MG: at 08:03

## 2023-05-13 RX ADMIN — DOCUSATE SODIUM SCH MG: 100 CAPSULE, LIQUID FILLED ORAL at 21:06

## 2023-05-13 RX ADMIN — PANTOPRAZOLE SODIUM SCH MG: 40 TABLET, DELAYED RELEASE ORAL at 06:16

## 2023-05-13 RX ADMIN — Medication PRN MG: at 17:39

## 2023-05-13 RX ADMIN — ASPIRIN SCH MG: 81 TABLET, COATED ORAL at 08:02

## 2023-05-13 RX ADMIN — Medication SCH MG: at 21:10

## 2023-05-13 RX ADMIN — Medication SCH ML: at 16:31

## 2023-05-13 RX ADMIN — LINAGLIPTIN SCH MG: 5 TABLET, FILM COATED ORAL at 08:04

## 2023-05-13 RX ADMIN — ANORECTAL OINTMENT SCH GM: 15.7; .44; 24; 20.6 OINTMENT TOPICAL at 08:04

## 2023-05-13 RX ADMIN — ENOXAPARIN SODIUM SCH MG: 30 INJECTION SUBCUTANEOUS at 21:06

## 2023-05-13 RX ADMIN — NIFEDIPINE SCH MG: 60 TABLET, FILM COATED, EXTENDED RELEASE ORAL at 08:02

## 2023-05-13 RX ADMIN — ANORECTAL OINTMENT SCH GM: 15.7; .44; 24; 20.6 OINTMENT TOPICAL at 21:07

## 2023-05-13 RX ADMIN — Medication PRN MG: at 05:19

## 2023-05-13 RX ADMIN — Medication SCH ML: at 08:03

## 2023-05-13 RX ADMIN — LEVOTHYROXINE SODIUM SCH MCG: 150 TABLET ORAL at 06:16

## 2023-05-13 RX ADMIN — Medication SCH ML: at 15:44

## 2023-05-13 RX ADMIN — ATORVASTATIN CALCIUM SCH MG: 10 TABLET, FILM COATED ORAL at 21:06

## 2023-05-14 VITALS — SYSTOLIC BLOOD PRESSURE: 176 MMHG | DIASTOLIC BLOOD PRESSURE: 66 MMHG

## 2023-05-14 VITALS — SYSTOLIC BLOOD PRESSURE: 163 MMHG | DIASTOLIC BLOOD PRESSURE: 57 MMHG

## 2023-05-14 VITALS — SYSTOLIC BLOOD PRESSURE: 112 MMHG | DIASTOLIC BLOOD PRESSURE: 58 MMHG

## 2023-05-14 VITALS — DIASTOLIC BLOOD PRESSURE: 60 MMHG | SYSTOLIC BLOOD PRESSURE: 154 MMHG

## 2023-05-14 RX ADMIN — ANORECTAL OINTMENT SCH GM: 15.7; .44; 24; 20.6 OINTMENT TOPICAL at 21:43

## 2023-05-14 RX ADMIN — DOCUSATE SODIUM SCH MG: 100 CAPSULE, LIQUID FILLED ORAL at 21:47

## 2023-05-14 RX ADMIN — Medication SCH MG: at 09:00

## 2023-05-14 RX ADMIN — MEGESTROL ACETATE SCH MG: 400 SUSPENSION ORAL at 11:36

## 2023-05-14 RX ADMIN — PANTOPRAZOLE SODIUM SCH MG: 40 TABLET, DELAYED RELEASE ORAL at 06:02

## 2023-05-14 RX ADMIN — LEVOTHYROXINE SODIUM SCH MCG: 150 TABLET ORAL at 06:02

## 2023-05-14 RX ADMIN — Medication PRN MG: at 05:33

## 2023-05-14 RX ADMIN — ASPIRIN SCH MG: 81 TABLET, COATED ORAL at 08:16

## 2023-05-14 RX ADMIN — ATORVASTATIN CALCIUM SCH MG: 10 TABLET, FILM COATED ORAL at 21:37

## 2023-05-14 RX ADMIN — Medication SCH MG: at 21:38

## 2023-05-14 RX ADMIN — ENOXAPARIN SODIUM SCH MG: 30 INJECTION SUBCUTANEOUS at 21:41

## 2023-05-14 RX ADMIN — Medication SCH ML: at 08:17

## 2023-05-14 RX ADMIN — NIFEDIPINE SCH MG: 60 TABLET, FILM COATED, EXTENDED RELEASE ORAL at 09:00

## 2023-05-14 RX ADMIN — LINAGLIPTIN SCH MG: 5 TABLET, FILM COATED ORAL at 08:16

## 2023-05-14 RX ADMIN — ANORECTAL OINTMENT SCH GM: 15.7; .44; 24; 20.6 OINTMENT TOPICAL at 08:17

## 2023-05-14 RX ADMIN — Medication SCH ML: at 16:08

## 2023-05-14 NOTE — NUR
patient is alert, awake, no sob, respirations are even nonlabored, skin warm and dry to 
touch, pain is managed with pain medications and nonpharmacological interventions. heels are 
floated on pillows, no skin issues noted at heels, no skin issues noted at sacral area, 
turned repositioned every 2 hours while in the bed. no distress or events noted overnight.

## 2023-05-15 VITALS — DIASTOLIC BLOOD PRESSURE: 49 MMHG | SYSTOLIC BLOOD PRESSURE: 146 MMHG

## 2023-05-15 VITALS — SYSTOLIC BLOOD PRESSURE: 167 MMHG | DIASTOLIC BLOOD PRESSURE: 62 MMHG

## 2023-05-15 VITALS — DIASTOLIC BLOOD PRESSURE: 45 MMHG | SYSTOLIC BLOOD PRESSURE: 138 MMHG

## 2023-05-15 RX ADMIN — ERGOCALCIFEROL SCH UNIT: 1.25 CAPSULE ORAL at 08:50

## 2023-05-15 RX ADMIN — MEGESTROL ACETATE SCH MG: 400 SUSPENSION ORAL at 08:50

## 2023-05-15 RX ADMIN — Medication SCH MG: at 08:50

## 2023-05-15 RX ADMIN — ASPIRIN SCH MG: 81 TABLET, COATED ORAL at 08:50

## 2023-05-15 RX ADMIN — Medication PRN MG: at 22:00

## 2023-05-15 RX ADMIN — Medication PRN MG: at 14:01

## 2023-05-15 RX ADMIN — Medication SCH ML: at 09:00

## 2023-05-15 RX ADMIN — LEVOTHYROXINE SODIUM SCH MCG: 150 TABLET ORAL at 06:24

## 2023-05-15 RX ADMIN — LINAGLIPTIN SCH MG: 5 TABLET, FILM COATED ORAL at 08:50

## 2023-05-15 RX ADMIN — ANORECTAL OINTMENT SCH GM: 15.7; .44; 24; 20.6 OINTMENT TOPICAL at 21:53

## 2023-05-15 RX ADMIN — DOCUSATE SODIUM SCH MG: 100 CAPSULE, LIQUID FILLED ORAL at 21:44

## 2023-05-15 RX ADMIN — Medication SCH ML: at 16:35

## 2023-05-15 RX ADMIN — ENOXAPARIN SODIUM SCH MG: 30 INJECTION SUBCUTANEOUS at 21:52

## 2023-05-15 RX ADMIN — Medication SCH MG: at 21:46

## 2023-05-15 RX ADMIN — PANTOPRAZOLE SODIUM SCH MG: 40 TABLET, DELAYED RELEASE ORAL at 06:24

## 2023-05-15 RX ADMIN — ATORVASTATIN CALCIUM SCH MG: 10 TABLET, FILM COATED ORAL at 21:45

## 2023-05-15 RX ADMIN — NIFEDIPINE SCH MG: 60 TABLET, FILM COATED, EXTENDED RELEASE ORAL at 08:50

## 2023-05-15 RX ADMIN — ANORECTAL OINTMENT SCH GM: 15.7; .44; 24; 20.6 OINTMENT TOPICAL at 09:01

## 2023-05-15 NOTE — NUR
RECEIVED REPORT FROM NIGHT  SHIFT RN. PATIENT IS ALERT AND ORIENTED X4.. NO ACUTE DISTRESS. 
ALL NEEDS MET AT THIS TIME. CALL LIGHT WITHIN REACH. FALL PRECAUTIONS IN PLACE.

## 2023-05-15 NOTE — NUR
AWAKE ,ALERT AND ORIENTED, DUE MEDS GIVEN, /62, NO C/O PRESENTED. ENDORSED TO AM NURSE 
IN APPARENTLY FAIR CONDITION.

## 2023-05-15 NOTE — NUR
HS CARE DONE. DUE MEDS GIVEN, MONITORED FOR BP CHANGES. SLEPT ON AND OFF. INCONTINENT OF 
URINE, KEPT DRY AND CLEAN.

## 2023-05-16 VITALS — DIASTOLIC BLOOD PRESSURE: 47 MMHG | SYSTOLIC BLOOD PRESSURE: 140 MMHG

## 2023-05-16 VITALS — DIASTOLIC BLOOD PRESSURE: 49 MMHG | SYSTOLIC BLOOD PRESSURE: 124 MMHG

## 2023-05-16 VITALS — DIASTOLIC BLOOD PRESSURE: 49 MMHG | SYSTOLIC BLOOD PRESSURE: 135 MMHG

## 2023-05-16 VITALS — DIASTOLIC BLOOD PRESSURE: 45 MMHG | SYSTOLIC BLOOD PRESSURE: 122 MMHG

## 2023-05-16 RX ADMIN — PANTOPRAZOLE SODIUM SCH MG: 40 TABLET, DELAYED RELEASE ORAL at 06:55

## 2023-05-16 RX ADMIN — LINAGLIPTIN SCH MG: 5 TABLET, FILM COATED ORAL at 08:38

## 2023-05-16 RX ADMIN — LEVOTHYROXINE SODIUM SCH MCG: 150 TABLET ORAL at 06:55

## 2023-05-16 RX ADMIN — NIFEDIPINE SCH MG: 60 TABLET, FILM COATED, EXTENDED RELEASE ORAL at 08:38

## 2023-05-16 RX ADMIN — ENOXAPARIN SODIUM SCH MG: 30 INJECTION SUBCUTANEOUS at 20:10

## 2023-05-16 RX ADMIN — ATORVASTATIN CALCIUM SCH MG: 10 TABLET, FILM COATED ORAL at 20:09

## 2023-05-16 RX ADMIN — Medication SCH ML: at 08:38

## 2023-05-16 RX ADMIN — Medication PRN MG: at 12:58

## 2023-05-16 RX ADMIN — DOCUSATE SODIUM SCH MG: 100 CAPSULE, LIQUID FILLED ORAL at 20:09

## 2023-05-16 RX ADMIN — Medication PRN MG: at 20:36

## 2023-05-16 RX ADMIN — FUROSEMIDE SCH MG: 20 TABLET ORAL at 08:57

## 2023-05-16 RX ADMIN — ANORECTAL OINTMENT SCH GM: 15.7; .44; 24; 20.6 OINTMENT TOPICAL at 20:10

## 2023-05-16 RX ADMIN — Medication SCH ML: at 16:30

## 2023-05-16 RX ADMIN — ANORECTAL OINTMENT SCH GM: 15.7; .44; 24; 20.6 OINTMENT TOPICAL at 08:39

## 2023-05-16 RX ADMIN — ASPIRIN SCH MG: 81 TABLET, COATED ORAL at 08:38

## 2023-05-16 RX ADMIN — MEGESTROL ACETATE SCH MG: 400 SUSPENSION ORAL at 08:38

## 2023-05-16 NOTE — NUR
sitting in the chair  family at bed side call light with in reach  pt c/o pain ,per pt 
request pain medication given

## 2023-05-17 VITALS — DIASTOLIC BLOOD PRESSURE: 58 MMHG | SYSTOLIC BLOOD PRESSURE: 109 MMHG

## 2023-05-17 VITALS — SYSTOLIC BLOOD PRESSURE: 135 MMHG | DIASTOLIC BLOOD PRESSURE: 62 MMHG

## 2023-05-17 VITALS — DIASTOLIC BLOOD PRESSURE: 55 MMHG | SYSTOLIC BLOOD PRESSURE: 146 MMHG

## 2023-05-17 VITALS — SYSTOLIC BLOOD PRESSURE: 108 MMHG | DIASTOLIC BLOOD PRESSURE: 42 MMHG

## 2023-05-17 LAB
HCT VFR BLD AUTO: 26.5 % (ref 31.2–41.9)
MCH RBC QN AUTO: 29.1 UUG (ref 24.7–32.8)
MCV RBC AUTO: 86.7 FL (ref 75.5–95.3)
PLATELET # BLD AUTO: 139 K/UL (ref 179–408)

## 2023-05-17 RX ADMIN — Medication PRN MG: at 13:33

## 2023-05-17 RX ADMIN — PANTOPRAZOLE SODIUM SCH MG: 40 TABLET, DELAYED RELEASE ORAL at 06:00

## 2023-05-17 RX ADMIN — Medication SCH ML: at 18:03

## 2023-05-17 RX ADMIN — Medication SCH ML: at 08:41

## 2023-05-17 RX ADMIN — LEVOTHYROXINE SODIUM SCH MCG: 150 TABLET ORAL at 06:00

## 2023-05-17 RX ADMIN — DOCUSATE SODIUM SCH MG: 100 CAPSULE, LIQUID FILLED ORAL at 21:24

## 2023-05-17 RX ADMIN — ENOXAPARIN SODIUM SCH MG: 30 INJECTION SUBCUTANEOUS at 21:25

## 2023-05-17 RX ADMIN — ATORVASTATIN CALCIUM SCH MG: 10 TABLET, FILM COATED ORAL at 21:24

## 2023-05-17 RX ADMIN — ANORECTAL OINTMENT SCH GM: 15.7; .44; 24; 20.6 OINTMENT TOPICAL at 08:34

## 2023-05-17 RX ADMIN — MEGESTROL ACETATE SCH MG: 400 SUSPENSION ORAL at 08:41

## 2023-05-17 RX ADMIN — FUROSEMIDE SCH MG: 20 TABLET ORAL at 08:43

## 2023-05-17 RX ADMIN — ANORECTAL OINTMENT SCH GM: 15.7; .44; 24; 20.6 OINTMENT TOPICAL at 21:30

## 2023-05-17 RX ADMIN — ASPIRIN SCH MG: 81 TABLET, COATED ORAL at 08:33

## 2023-05-17 RX ADMIN — ACETAMINOPHEN PRN MG: 325 TABLET ORAL at 09:33

## 2023-05-17 RX ADMIN — LINAGLIPTIN SCH MG: 5 TABLET, FILM COATED ORAL at 08:34

## 2023-05-17 RX ADMIN — NIFEDIPINE SCH MG: 60 TABLET, FILM COATED, EXTENDED RELEASE ORAL at 08:42

## 2023-05-17 NOTE — NUR
Received patient lying in bed conscious and coherent. No complain at this time, no signs of 
distress. On room air saturating at 96%. Patient ambulate with PT using FWW, WBAT. 

Call light within reached, fall precaution. Needs attended

## 2023-05-17 NOTE — NUR
Seen patient lying in bed comfortably. Served lunch. No acute changes from morning 
assessment

Attended

## 2023-05-17 NOTE — NUR
Pt received awake in her bed, sling on right arm present. pt is able to state her needs. pt 
is compliant with medications, breathing is non-labored, call light is within reach

## 2023-05-17 NOTE — NUR
Seen patient sitting in a wheel chair, complaining of pain 8/10 pain score. 

Oxycodone 15mg tab given as ordered, patient tolerated oral medications

Seen and examined by Dr. Silva

Observed accordingly

## 2023-05-18 VITALS — DIASTOLIC BLOOD PRESSURE: 73 MMHG | SYSTOLIC BLOOD PRESSURE: 133 MMHG

## 2023-05-18 VITALS — SYSTOLIC BLOOD PRESSURE: 175 MMHG | DIASTOLIC BLOOD PRESSURE: 73 MMHG

## 2023-05-18 VITALS — SYSTOLIC BLOOD PRESSURE: 139 MMHG | DIASTOLIC BLOOD PRESSURE: 46 MMHG

## 2023-05-18 VITALS — DIASTOLIC BLOOD PRESSURE: 53 MMHG | SYSTOLIC BLOOD PRESSURE: 152 MMHG

## 2023-05-18 RX ADMIN — Medication SCH ML: at 08:50

## 2023-05-18 RX ADMIN — ANORECTAL OINTMENT SCH GM: 15.7; .44; 24; 20.6 OINTMENT TOPICAL at 08:50

## 2023-05-18 RX ADMIN — DOCUSATE SODIUM SCH MG: 100 CAPSULE, LIQUID FILLED ORAL at 20:42

## 2023-05-18 RX ADMIN — ENOXAPARIN SODIUM SCH MG: 30 INJECTION SUBCUTANEOUS at 20:45

## 2023-05-18 RX ADMIN — NIFEDIPINE SCH MG: 60 TABLET, FILM COATED, EXTENDED RELEASE ORAL at 08:46

## 2023-05-18 RX ADMIN — PANTOPRAZOLE SODIUM SCH MG: 40 TABLET, DELAYED RELEASE ORAL at 06:07

## 2023-05-18 RX ADMIN — FUROSEMIDE SCH MG: 20 TABLET ORAL at 08:47

## 2023-05-18 RX ADMIN — ASPIRIN SCH MG: 81 TABLET, COATED ORAL at 08:47

## 2023-05-18 RX ADMIN — LEVOTHYROXINE SODIUM SCH MCG: 150 TABLET ORAL at 06:17

## 2023-05-18 RX ADMIN — ANORECTAL OINTMENT SCH GM: 15.7; .44; 24; 20.6 OINTMENT TOPICAL at 20:47

## 2023-05-18 RX ADMIN — Medication PRN MG: at 13:25

## 2023-05-18 RX ADMIN — MEGESTROL ACETATE SCH MG: 400 SUSPENSION ORAL at 08:49

## 2023-05-18 RX ADMIN — HYDROCODONE BITARTRATE AND ACETAMINOPHEN SCH TAB: 10; 325 TABLET ORAL at 22:23

## 2023-05-18 RX ADMIN — ACETAMINOPHEN PRN MG: 325 TABLET ORAL at 08:47

## 2023-05-18 RX ADMIN — ATORVASTATIN CALCIUM SCH MG: 10 TABLET, FILM COATED ORAL at 20:42

## 2023-05-18 RX ADMIN — LINAGLIPTIN SCH MG: 5 TABLET, FILM COATED ORAL at 08:47

## 2023-05-18 NOTE — NUR
Nursing - Patient was ambulated by P.T. to the bathroom this am. had been continent of her 
bowels and bladder, needing assist with her simple hygiene , also noted bladder 
incontinence, r/t  urgency, wears diaper.Sling to her right upper ext. denies any numbness, 
no tingling .Safety reviewed, stayed up in her wheel chair this am . Compliant in calling 
for assist.

## 2023-05-19 VITALS — SYSTOLIC BLOOD PRESSURE: 146 MMHG | DIASTOLIC BLOOD PRESSURE: 58 MMHG

## 2023-05-19 VITALS — SYSTOLIC BLOOD PRESSURE: 139 MMHG | DIASTOLIC BLOOD PRESSURE: 51 MMHG

## 2023-05-19 VITALS — SYSTOLIC BLOOD PRESSURE: 115 MMHG | DIASTOLIC BLOOD PRESSURE: 49 MMHG

## 2023-05-19 VITALS — DIASTOLIC BLOOD PRESSURE: 50 MMHG | SYSTOLIC BLOOD PRESSURE: 136 MMHG

## 2023-05-19 VITALS — DIASTOLIC BLOOD PRESSURE: 46 MMHG | SYSTOLIC BLOOD PRESSURE: 139 MMHG

## 2023-05-19 RX ADMIN — LINAGLIPTIN SCH MG: 5 TABLET, FILM COATED ORAL at 08:25

## 2023-05-19 RX ADMIN — ANORECTAL OINTMENT SCH GM: 15.7; .44; 24; 20.6 OINTMENT TOPICAL at 21:00

## 2023-05-19 RX ADMIN — ATORVASTATIN CALCIUM SCH MG: 10 TABLET, FILM COATED ORAL at 22:36

## 2023-05-19 RX ADMIN — HYDROCODONE BITARTRATE AND ACETAMINOPHEN SCH TAB: 10; 325 TABLET ORAL at 05:50

## 2023-05-19 RX ADMIN — FUROSEMIDE SCH MG: 20 TABLET ORAL at 08:25

## 2023-05-19 RX ADMIN — ENOXAPARIN SODIUM SCH MG: 30 INJECTION SUBCUTANEOUS at 22:40

## 2023-05-19 RX ADMIN — DOCUSATE SODIUM SCH MG: 100 CAPSULE, LIQUID FILLED ORAL at 22:34

## 2023-05-19 RX ADMIN — HYDROCODONE BITARTRATE AND ACETAMINOPHEN SCH TAB: 10; 325 TABLET ORAL at 22:36

## 2023-05-19 RX ADMIN — PANTOPRAZOLE SODIUM SCH MG: 40 TABLET, DELAYED RELEASE ORAL at 06:29

## 2023-05-19 RX ADMIN — MEGESTROL ACETATE SCH MG: 400 SUSPENSION ORAL at 08:26

## 2023-05-19 RX ADMIN — NIFEDIPINE SCH MG: 60 TABLET, FILM COATED, EXTENDED RELEASE ORAL at 08:24

## 2023-05-19 RX ADMIN — LEVOTHYROXINE SODIUM SCH MCG: 150 TABLET ORAL at 06:29

## 2023-05-19 RX ADMIN — HYDROCODONE BITARTRATE AND ACETAMINOPHEN SCH TAB: 10; 325 TABLET ORAL at 14:12

## 2023-05-19 RX ADMIN — ANORECTAL OINTMENT SCH GM: 15.7; .44; 24; 20.6 OINTMENT TOPICAL at 08:25

## 2023-05-19 RX ADMIN — ASPIRIN SCH MG: 81 TABLET, COATED ORAL at 08:25

## 2023-05-19 NOTE — NUR
Pt. has been stable during the shift. No c/o pain. Walked with PT. Call light within reach. 
Able to make the need known. Bed in low position. Kept the pt. clean and dry. Will keep 
monitoring the patient.

## 2023-05-20 VITALS — SYSTOLIC BLOOD PRESSURE: 112 MMHG | DIASTOLIC BLOOD PRESSURE: 61 MMHG

## 2023-05-20 VITALS — SYSTOLIC BLOOD PRESSURE: 162 MMHG | DIASTOLIC BLOOD PRESSURE: 62 MMHG

## 2023-05-20 VITALS — DIASTOLIC BLOOD PRESSURE: 66 MMHG | SYSTOLIC BLOOD PRESSURE: 155 MMHG

## 2023-05-20 VITALS — DIASTOLIC BLOOD PRESSURE: 68 MMHG | SYSTOLIC BLOOD PRESSURE: 138 MMHG

## 2023-05-20 RX ADMIN — HYDROCODONE BITARTRATE AND ACETAMINOPHEN SCH TAB: 10; 325 TABLET ORAL at 06:44

## 2023-05-20 RX ADMIN — FUROSEMIDE SCH MG: 20 TABLET ORAL at 13:09

## 2023-05-20 RX ADMIN — NIFEDIPINE SCH MG: 60 TABLET, FILM COATED, EXTENDED RELEASE ORAL at 13:25

## 2023-05-20 RX ADMIN — MEGESTROL ACETATE SCH MG: 400 SUSPENSION ORAL at 13:09

## 2023-05-20 RX ADMIN — HYDROCODONE BITARTRATE AND ACETAMINOPHEN SCH TAB: 10; 325 TABLET ORAL at 21:18

## 2023-05-20 RX ADMIN — ATORVASTATIN CALCIUM SCH MG: 10 TABLET, FILM COATED ORAL at 21:06

## 2023-05-20 RX ADMIN — HYDROCODONE BITARTRATE AND ACETAMINOPHEN SCH TAB: 10; 325 TABLET ORAL at 13:32

## 2023-05-20 RX ADMIN — PANTOPRAZOLE SODIUM SCH MG: 40 TABLET, DELAYED RELEASE ORAL at 06:44

## 2023-05-20 RX ADMIN — LINAGLIPTIN SCH MG: 5 TABLET, FILM COATED ORAL at 13:23

## 2023-05-20 RX ADMIN — ANORECTAL OINTMENT SCH GM: 15.7; .44; 24; 20.6 OINTMENT TOPICAL at 21:06

## 2023-05-20 RX ADMIN — LEVOTHYROXINE SODIUM SCH MCG: 150 TABLET ORAL at 06:44

## 2023-05-20 RX ADMIN — DOCUSATE SODIUM SCH MG: 100 CAPSULE, LIQUID FILLED ORAL at 21:06

## 2023-05-20 RX ADMIN — ASPIRIN SCH MG: 81 TABLET, COATED ORAL at 13:08

## 2023-05-20 RX ADMIN — ENOXAPARIN SODIUM SCH MG: 30 INJECTION SUBCUTANEOUS at 21:15

## 2023-05-20 RX ADMIN — ANORECTAL OINTMENT SCH GM: 15.7; .44; 24; 20.6 OINTMENT TOPICAL at 09:00

## 2023-05-20 NOTE — NUR
Pt received ambulating with PT, Pt was medicated with norco at 0630 am. Pt back to bed, 
spoke with family members at bedside regarding pt health condition. Pt understand the word 
medicine able to swallow whole tab no aspiration observed. Pt was assisted with assisted 
device no SOB observed. Pt was medicated with Norco as order. Endorse  care to incoming 
nurse.

## 2023-05-21 VITALS — DIASTOLIC BLOOD PRESSURE: 58 MMHG | SYSTOLIC BLOOD PRESSURE: 139 MMHG

## 2023-05-21 VITALS — SYSTOLIC BLOOD PRESSURE: 130 MMHG | DIASTOLIC BLOOD PRESSURE: 47 MMHG

## 2023-05-21 VITALS — SYSTOLIC BLOOD PRESSURE: 140 MMHG | DIASTOLIC BLOOD PRESSURE: 45 MMHG

## 2023-05-21 VITALS — SYSTOLIC BLOOD PRESSURE: 146 MMHG | DIASTOLIC BLOOD PRESSURE: 71 MMHG

## 2023-05-21 RX ADMIN — LEVOTHYROXINE SODIUM SCH MCG: 150 TABLET ORAL at 06:09

## 2023-05-21 RX ADMIN — LINAGLIPTIN SCH MG: 5 TABLET, FILM COATED ORAL at 09:43

## 2023-05-21 RX ADMIN — ASPIRIN SCH MG: 81 TABLET, COATED ORAL at 09:43

## 2023-05-21 RX ADMIN — MEGESTROL ACETATE SCH MG: 400 SUSPENSION ORAL at 09:40

## 2023-05-21 RX ADMIN — HYDROCODONE BITARTRATE AND ACETAMINOPHEN SCH TAB: 10; 325 TABLET ORAL at 22:12

## 2023-05-21 RX ADMIN — ANORECTAL OINTMENT SCH GM: 15.7; .44; 24; 20.6 OINTMENT TOPICAL at 22:07

## 2023-05-21 RX ADMIN — HYDROCODONE BITARTRATE AND ACETAMINOPHEN SCH TAB: 10; 325 TABLET ORAL at 16:08

## 2023-05-21 RX ADMIN — NIFEDIPINE SCH MG: 60 TABLET, FILM COATED, EXTENDED RELEASE ORAL at 09:42

## 2023-05-21 RX ADMIN — DOCUSATE SODIUM SCH MG: 100 CAPSULE, LIQUID FILLED ORAL at 22:06

## 2023-05-21 RX ADMIN — ANORECTAL OINTMENT SCH GM: 15.7; .44; 24; 20.6 OINTMENT TOPICAL at 09:40

## 2023-05-21 RX ADMIN — HYDROCODONE BITARTRATE AND ACETAMINOPHEN SCH TAB: 10; 325 TABLET ORAL at 05:34

## 2023-05-21 RX ADMIN — FUROSEMIDE SCH MG: 20 TABLET ORAL at 09:41

## 2023-05-21 RX ADMIN — PANTOPRAZOLE SODIUM SCH MG: 40 TABLET, DELAYED RELEASE ORAL at 06:07

## 2023-05-21 RX ADMIN — ATORVASTATIN CALCIUM SCH MG: 10 TABLET, FILM COATED ORAL at 22:06

## 2023-05-21 RX ADMIN — ENOXAPARIN SODIUM SCH MG: 30 INJECTION SUBCUTANEOUS at 22:08

## 2023-05-21 NOTE — NUR
rounds made patient in bed sleeping breathing even and unlabored no respiratory distress 
noted. no s/s of pain noted will given schedule medication when patient awaken .

## 2023-05-21 NOTE — NUR
Pt received ambulating with PT, Pt did not c/o any pain. Pt back to bed, spoke with family 
members at bedside regarding pt health condition and pt did not requested pain medication.. 
Pt understand the word medicine able to swallow whole tab no aspiration observed. Pt was 
assisted with assisted device to BR no SOB observed. Pt was medicated with Norco as routine 
order. Endorse  care to incoming nurse

## 2023-05-21 NOTE — NUR
round made patient in bed watching tv no complains made advised to call for assistance call 
light placed with in reach .

## 2023-05-22 VITALS — SYSTOLIC BLOOD PRESSURE: 141 MMHG | DIASTOLIC BLOOD PRESSURE: 51 MMHG

## 2023-05-22 VITALS — SYSTOLIC BLOOD PRESSURE: 121 MMHG | DIASTOLIC BLOOD PRESSURE: 62 MMHG

## 2023-05-22 VITALS — SYSTOLIC BLOOD PRESSURE: 140 MMHG | DIASTOLIC BLOOD PRESSURE: 54 MMHG

## 2023-05-22 LAB
ALP SERPL-CCNC: 210 U/L (ref 50–136)
ALT SERPL W/O P-5'-P-CCNC: 24 U/L (ref 14–59)
AST SERPL-CCNC: 18 U/L (ref 15–37)
BILIRUB SERPL-MCNC: 0.7 MG/DL (ref 0.2–1)
BUN SERPL-MCNC: 68 MG/DL (ref 7–18)
CHLORIDE SERPL-SCNC: 110 MMOL/L (ref 98–107)
CO2 SERPL-SCNC: 22 MMOL/L (ref 21–32)
CREAT SERPL-MCNC: 2.2 MG/DL (ref 0.6–1.3)
GLUCOSE SERPL-MCNC: 127 MG/DL (ref 74–106)
HCT VFR BLD AUTO: 26.7 % (ref 31.2–41.9)
MAGNESIUM SERPL-MCNC: 2.5 MG/DL (ref 1.8–2.4)
MCH RBC QN AUTO: 28.5 UUG (ref 24.7–32.8)
MCV RBC AUTO: 86.6 FL (ref 75.5–95.3)
PHOSPHATE SERPL-MCNC: 4.3 MG/DL (ref 2.5–4.9)
PLATELET # BLD AUTO: 154 K/UL (ref 179–408)
POTASSIUM SERPL-SCNC: 4.8 MMOL/L (ref 3.5–5.1)
WS STN SPEC: 7 G/DL (ref 6.4–8.2)

## 2023-05-22 RX ADMIN — ATORVASTATIN CALCIUM SCH MG: 10 TABLET, FILM COATED ORAL at 21:10

## 2023-05-22 RX ADMIN — HYDROCODONE BITARTRATE AND ACETAMINOPHEN SCH TAB: 10; 325 TABLET ORAL at 06:39

## 2023-05-22 RX ADMIN — LINAGLIPTIN SCH MG: 5 TABLET, FILM COATED ORAL at 09:13

## 2023-05-22 RX ADMIN — ERGOCALCIFEROL SCH UNIT: 1.25 CAPSULE ORAL at 09:13

## 2023-05-22 RX ADMIN — DOCUSATE SODIUM SCH MG: 100 CAPSULE, LIQUID FILLED ORAL at 21:11

## 2023-05-22 RX ADMIN — HYDROCODONE BITARTRATE AND ACETAMINOPHEN SCH TAB: 10; 325 TABLET ORAL at 21:11

## 2023-05-22 RX ADMIN — LEVOTHYROXINE SODIUM SCH MCG: 150 TABLET ORAL at 06:39

## 2023-05-22 RX ADMIN — ANORECTAL OINTMENT SCH GM: 15.7; .44; 24; 20.6 OINTMENT TOPICAL at 09:14

## 2023-05-22 RX ADMIN — HYDROCODONE BITARTRATE AND ACETAMINOPHEN SCH TAB: 10; 325 TABLET ORAL at 14:52

## 2023-05-22 RX ADMIN — FUROSEMIDE SCH MG: 20 TABLET ORAL at 09:13

## 2023-05-22 RX ADMIN — ANORECTAL OINTMENT SCH GM: 15.7; .44; 24; 20.6 OINTMENT TOPICAL at 21:11

## 2023-05-22 RX ADMIN — PANTOPRAZOLE SODIUM SCH MG: 40 TABLET, DELAYED RELEASE ORAL at 06:39

## 2023-05-22 RX ADMIN — MEGESTROL ACETATE SCH MG: 400 SUSPENSION ORAL at 09:14

## 2023-05-22 RX ADMIN — Medication PRN MG: at 13:25

## 2023-05-22 RX ADMIN — NIFEDIPINE SCH MG: 60 TABLET, FILM COATED, EXTENDED RELEASE ORAL at 09:13

## 2023-05-22 RX ADMIN — ASPIRIN SCH MG: 81 TABLET, COATED ORAL at 09:13

## 2023-05-22 RX ADMIN — ENOXAPARIN SODIUM SCH MG: 30 INJECTION SUBCUTANEOUS at 21:00

## 2023-05-22 NOTE — NUR
Patient alert and oriented, verbalizes needs and follows directions. OOB daily sitting at 
bedside. Requires of one person's assist with her transfers bed/w/c. VSS during shift, 
medicated PRN for pain based on pain level needs/as ordered by MD and effective. Continues 
under rehab for PT/OT skilled services, patient lesia. therapy well and able to actively 
participate in therapy. Fall precautions observed, a clutter free environment provided, 
routine rounds and frequent visual checks done. All needs attended well and met through out 
the shift.

## 2023-05-22 NOTE — NUR
NSG: Received patient lying in bed. alert and oriented, pleasant upon approach. Denies pain 
or discomfort at this time. call light w/in reach.

## 2023-05-22 NOTE — NUR
uneventful shift patient slept most of the night needs attended to due medication scan and  
given see emar .

## 2023-05-23 VITALS — DIASTOLIC BLOOD PRESSURE: 63 MMHG | SYSTOLIC BLOOD PRESSURE: 174 MMHG

## 2023-05-23 VITALS — DIASTOLIC BLOOD PRESSURE: 46 MMHG | SYSTOLIC BLOOD PRESSURE: 134 MMHG

## 2023-05-23 VITALS — SYSTOLIC BLOOD PRESSURE: 160 MMHG | DIASTOLIC BLOOD PRESSURE: 61 MMHG

## 2023-05-23 VITALS — DIASTOLIC BLOOD PRESSURE: 62 MMHG | SYSTOLIC BLOOD PRESSURE: 166 MMHG

## 2023-05-23 LAB
APPEARANCE UR: CLEAR
BILIRUB UR QL STRIP: NEGATIVE
CK SERPL-CCNC: 27 U/L (ref 26–192)
COLOR UR: YELLOW
CREAT UR-MCNC: 57.5 MG/DL (ref 30–125)
GLUCOSE UR STRIP-MCNC: NEGATIVE MG/DL
HCT VFR BLD AUTO: 25.3 % (ref 31.2–41.9)
HGB UR QL STRIP: NEGATIVE
KETONES UR STRIP-MCNC: NEGATIVE MG/DL
LEUKOCYTE ESTERASE UR QL STRIP: NEGATIVE
MAGNESIUM SERPL-MCNC: 2.3 MG/DL (ref 1.8–2.4)
MCH RBC QN AUTO: 28.7 UUG (ref 24.7–32.8)
MCV RBC AUTO: 86.8 FL (ref 75.5–95.3)
NITRITE UR QL STRIP: NEGATIVE
PH UR STRIP: 6 [PH] (ref 5–8)
PHOSPHATE SERPL-MCNC: 4.6 MG/DL (ref 2.5–4.9)
PLATELET # BLD AUTO: 149 K/UL (ref 179–408)
PROT UR-MCNC: 37.6 MG/DL
RBC #/AREA URNS HPF: (no result) /HPF (ref 0–3)
SP GR UR STRIP: 1.01 (ref 1–1.03)
UROBILINOGEN UR STRIP-MCNC: 0.2 E.U./DL
WBC #/AREA URNS HPF: (no result) /HPF (ref 0–3)

## 2023-05-23 RX ADMIN — ASPIRIN SCH MG: 81 TABLET, COATED ORAL at 08:34

## 2023-05-23 RX ADMIN — ENOXAPARIN SODIUM SCH MG: 30 INJECTION SUBCUTANEOUS at 21:28

## 2023-05-23 RX ADMIN — MEGESTROL ACETATE SCH MG: 400 SUSPENSION ORAL at 08:34

## 2023-05-23 RX ADMIN — DOCUSATE SODIUM SCH MG: 100 CAPSULE, LIQUID FILLED ORAL at 21:25

## 2023-05-23 RX ADMIN — LEVOTHYROXINE SODIUM SCH MCG: 150 TABLET ORAL at 06:00

## 2023-05-23 RX ADMIN — HYDROCODONE BITARTRATE AND ACETAMINOPHEN SCH TAB: 10; 325 TABLET ORAL at 21:25

## 2023-05-23 RX ADMIN — ANORECTAL OINTMENT SCH GM: 15.7; .44; 24; 20.6 OINTMENT TOPICAL at 21:28

## 2023-05-23 RX ADMIN — ATORVASTATIN CALCIUM SCH MG: 10 TABLET, FILM COATED ORAL at 21:25

## 2023-05-23 RX ADMIN — PANTOPRAZOLE SODIUM SCH MG: 40 TABLET, DELAYED RELEASE ORAL at 05:42

## 2023-05-23 RX ADMIN — LINAGLIPTIN SCH MG: 5 TABLET, FILM COATED ORAL at 08:33

## 2023-05-23 RX ADMIN — HYDROCODONE BITARTRATE AND ACETAMINOPHEN SCH TAB: 10; 325 TABLET ORAL at 05:38

## 2023-05-23 RX ADMIN — NIFEDIPINE SCH MG: 60 TABLET, FILM COATED, EXTENDED RELEASE ORAL at 08:34

## 2023-05-23 RX ADMIN — HYDROCODONE BITARTRATE AND ACETAMINOPHEN SCH TAB: 10; 325 TABLET ORAL at 13:08

## 2023-05-23 RX ADMIN — ANORECTAL OINTMENT SCH GM: 15.7; .44; 24; 20.6 OINTMENT TOPICAL at 08:34

## 2023-05-23 NOTE — NUR
patient is alert, oriented x3, no sob, respirations are even nonlabored, skin warm and dry 
to touch, both heel skin inspected, no issues noted. patient tolerated PT, OT well. no 
distress noted.

## 2023-05-24 VITALS — SYSTOLIC BLOOD PRESSURE: 174 MMHG | DIASTOLIC BLOOD PRESSURE: 68 MMHG

## 2023-05-24 VITALS — SYSTOLIC BLOOD PRESSURE: 146 MMHG | DIASTOLIC BLOOD PRESSURE: 51 MMHG

## 2023-05-24 VITALS — DIASTOLIC BLOOD PRESSURE: 53 MMHG | SYSTOLIC BLOOD PRESSURE: 110 MMHG

## 2023-05-24 LAB
ALBUMIN SERPL ELPH-MCNC: 3.1 G/DL (ref 2.9–4.4)
ALBUMIN/GLOB SERPL: 1 {RATIO} (ref 0.7–1.7)
ALPHA1 GLOB SERPL ELPH-MCNC: 0.4 G/DL (ref 0–0.4)
ALPHA2 GLOB SERPL ELPH-MCNC: 0.6 G/DL (ref 0.4–1)
B-GLOBULIN SERPL ELPH-MCNC: 1 G/DL (ref 0.7–1.3)
GAMMA GLOB SERPL ELPH-MCNC: 1.1 G/DL (ref 0.4–1.8)
GLOBULIN SER CALC-MCNC: 3 G/DL (ref 2.2–3.9)

## 2023-05-24 RX ADMIN — HYDROCODONE BITARTRATE AND ACETAMINOPHEN SCH TAB: 10; 325 TABLET ORAL at 05:27

## 2023-05-24 RX ADMIN — Medication PRN MG: at 08:31

## 2023-05-24 RX ADMIN — NIFEDIPINE SCH MG: 60 TABLET, FILM COATED, EXTENDED RELEASE ORAL at 08:29

## 2023-05-24 RX ADMIN — HYDROCODONE BITARTRATE AND ACETAMINOPHEN SCH TAB: 10; 325 TABLET ORAL at 13:49

## 2023-05-24 RX ADMIN — LINAGLIPTIN SCH MG: 5 TABLET, FILM COATED ORAL at 08:27

## 2023-05-24 RX ADMIN — ASPIRIN SCH MG: 81 TABLET, COATED ORAL at 08:27

## 2023-05-24 RX ADMIN — MEGESTROL ACETATE SCH MG: 400 SUSPENSION ORAL at 08:29

## 2023-05-24 RX ADMIN — Medication PRN MG: at 18:18

## 2023-05-24 RX ADMIN — PANTOPRAZOLE SODIUM SCH MG: 40 TABLET, DELAYED RELEASE ORAL at 06:09

## 2023-05-24 RX ADMIN — LEVOTHYROXINE SODIUM SCH MCG: 150 TABLET ORAL at 06:09

## 2023-05-24 RX ADMIN — ANORECTAL OINTMENT SCH APPLIC: 15.7; .44; 24; 20.6 OINTMENT TOPICAL at 08:30

## 2023-05-24 NOTE — NUR
Pt discharged from the unit in stable condition via artemio, accompanied by Antonieta and Hany, 
EMTs from [facility], with pt's dtr to follow in her car. Pt's BPs were running high. I spoke to 
Dr. Wright who ordered clonidine twice and we also gave her her oxyir prior to d/c. Clarence, 
the supervising nurse at [facility], was notified of high BPs, he spoke to Dr. Wright and 
both were in agreement to continue with admission to [facility]. Pt laughing with her 
daughter prior to discharge. No s/s of distress other than high BP noted. No SOB at this 
time.

## 2023-05-24 NOTE — NUR
AAOx4 Patient admitted for a right pelvic fracture and right shoulder

fracture, but no surgical intervention. All needs attended. VSS. No

acute distress noted. Compliant with meds. On Norco given as scheduled

times. Right shoulder arm sling in placed. Purewick to suction, moderate

amount of yellow urine noted. Kept comfortable. Will monitor patient.

Patient possible discharge today to SNF?